# Patient Record
Sex: MALE | Race: WHITE | Employment: OTHER | URBAN - METROPOLITAN AREA
[De-identification: names, ages, dates, MRNs, and addresses within clinical notes are randomized per-mention and may not be internally consistent; named-entity substitution may affect disease eponyms.]

---

## 2017-01-01 ENCOUNTER — HOSPITAL ENCOUNTER (OUTPATIENT)
Dept: LAB | Age: 82
Discharge: HOME | End: 2017-06-27
Payer: MEDICARE

## 2017-01-01 ENCOUNTER — ANESTHESIA (OUTPATIENT)
Dept: CARDIOTHORACIC SURGERY | Age: 82
DRG: 269 | End: 2017-01-01
Payer: MEDICARE

## 2017-01-01 ENCOUNTER — HOSPITAL ENCOUNTER (OUTPATIENT)
Dept: LAB | Age: 82
Discharge: HOME | DRG: 269 | End: 2017-08-09
Payer: MEDICARE

## 2017-01-01 ENCOUNTER — ANESTHESIA EVENT (OUTPATIENT)
Dept: CARDIOTHORACIC SURGERY | Age: 82
DRG: 269 | End: 2017-01-01
Payer: MEDICARE

## 2017-01-01 ENCOUNTER — HOSPITAL ENCOUNTER (OUTPATIENT)
Dept: LAB | Age: 82
Discharge: HOME | End: 2017-05-16
Payer: MEDICARE

## 2017-01-01 ENCOUNTER — HOSPITAL ENCOUNTER (OUTPATIENT)
Dept: LAB | Age: 82
Discharge: HOME | DRG: 269 | End: 2017-08-11
Payer: MEDICARE

## 2017-01-01 ENCOUNTER — HOSPITAL ENCOUNTER (OUTPATIENT)
Dept: GENERAL RADIOLOGY | Age: 82
Discharge: HOME | DRG: 269 | End: 2017-08-09
Payer: MEDICARE

## 2017-01-01 ENCOUNTER — OFFICE VISIT (OUTPATIENT)
Dept: INTERNAL MEDICINE CLINIC | Age: 82
End: 2017-01-01

## 2017-01-01 ENCOUNTER — OFFICE VISIT (OUTPATIENT)
Dept: VASCULAR SURGERY | Age: 82
End: 2017-01-01

## 2017-01-01 ENCOUNTER — HOSPITAL ENCOUNTER (INPATIENT)
Age: 82
LOS: 1 days | Discharge: HOME | DRG: 269 | End: 2017-08-11
Attending: SURGERY | Admitting: SURGERY
Payer: MEDICARE

## 2017-01-01 ENCOUNTER — PATIENT OUTREACH (OUTPATIENT)
Dept: INTERNAL MEDICINE CLINIC | Age: 82
End: 2017-01-01

## 2017-01-01 ENCOUNTER — OFFICE VISIT (OUTPATIENT)
Dept: CARDIOLOGY CLINIC | Age: 82
End: 2017-01-01

## 2017-01-01 ENCOUNTER — HOSPITAL ENCOUNTER (EMERGENCY)
Age: 82
Discharge: HOME | End: 2017-04-16
Attending: EMERGENCY MEDICINE
Payer: MEDICARE

## 2017-01-01 ENCOUNTER — TELEPHONE (OUTPATIENT)
Dept: ORTHOPEDIC SURGERY | Age: 82
End: 2017-01-01

## 2017-01-01 ENCOUNTER — OFFICE VISIT (OUTPATIENT)
Dept: ORTHOPEDIC SURGERY | Age: 82
End: 2017-01-01

## 2017-01-01 ENCOUNTER — HOSPITAL ENCOUNTER (OUTPATIENT)
Dept: LAB | Age: 82
Discharge: HOME | End: 2017-02-07
Payer: MEDICARE

## 2017-01-01 ENCOUNTER — APPOINTMENT (OUTPATIENT)
Dept: GENERAL RADIOLOGY | Age: 82
DRG: 269 | End: 2017-01-01
Attending: SURGERY
Payer: MEDICARE

## 2017-01-01 ENCOUNTER — HOSPITAL ENCOUNTER (OUTPATIENT)
Dept: LAB | Age: 82
Discharge: HOME | End: 2017-02-10
Payer: MEDICARE

## 2017-01-01 ENCOUNTER — LAB ONLY (OUTPATIENT)
Dept: INTERNAL MEDICINE CLINIC | Age: 82
End: 2017-01-01

## 2017-01-01 ENCOUNTER — APPOINTMENT (OUTPATIENT)
Dept: GENERAL RADIOLOGY | Age: 82
End: 2017-01-01
Attending: EMERGENCY MEDICINE
Payer: MEDICARE

## 2017-01-01 ENCOUNTER — APPOINTMENT (OUTPATIENT)
Dept: CT IMAGING | Age: 82
End: 2017-01-01
Attending: EMERGENCY MEDICINE
Payer: MEDICARE

## 2017-01-01 ENCOUNTER — HOSPITAL ENCOUNTER (OUTPATIENT)
Dept: NON INVASIVE DIAGNOSTICS | Age: 82
Discharge: HOME | End: 2017-08-04
Attending: INTERNAL MEDICINE
Payer: MEDICARE

## 2017-01-01 VITALS
HEIGHT: 71 IN | OXYGEN SATURATION: 96 % | WEIGHT: 202.6 LBS | TEMPERATURE: 97.9 F | RESPIRATION RATE: 24 BRPM | DIASTOLIC BLOOD PRESSURE: 75 MMHG | SYSTOLIC BLOOD PRESSURE: 125 MMHG | HEART RATE: 68 BPM | BODY MASS INDEX: 28.36 KG/M2

## 2017-01-01 VITALS
SYSTOLIC BLOOD PRESSURE: 134 MMHG | BODY MASS INDEX: 29.55 KG/M2 | HEART RATE: 62 BPM | WEIGHT: 206.4 LBS | TEMPERATURE: 97.8 F | DIASTOLIC BLOOD PRESSURE: 72 MMHG | OXYGEN SATURATION: 97 % | HEIGHT: 70 IN | RESPIRATION RATE: 14 BRPM

## 2017-01-01 VITALS
WEIGHT: 213.4 LBS | RESPIRATION RATE: 12 BRPM | OXYGEN SATURATION: 95 % | TEMPERATURE: 98 F | HEIGHT: 70 IN | BODY MASS INDEX: 30.55 KG/M2 | SYSTOLIC BLOOD PRESSURE: 138 MMHG | DIASTOLIC BLOOD PRESSURE: 62 MMHG | HEART RATE: 71 BPM

## 2017-01-01 VITALS
RESPIRATION RATE: 18 BRPM | SYSTOLIC BLOOD PRESSURE: 140 MMHG | DIASTOLIC BLOOD PRESSURE: 65 MMHG | BODY MASS INDEX: 29.35 KG/M2 | OXYGEN SATURATION: 98 % | HEART RATE: 68 BPM | WEIGHT: 205 LBS | HEIGHT: 70 IN | TEMPERATURE: 97.5 F

## 2017-01-01 VITALS
BODY MASS INDEX: 28 KG/M2 | RESPIRATION RATE: 16 BRPM | WEIGHT: 200 LBS | HEART RATE: 69 BPM | DIASTOLIC BLOOD PRESSURE: 86 MMHG | SYSTOLIC BLOOD PRESSURE: 144 MMHG | HEIGHT: 71 IN

## 2017-01-01 VITALS
TEMPERATURE: 98.4 F | OXYGEN SATURATION: 94 % | DIASTOLIC BLOOD PRESSURE: 60 MMHG | HEIGHT: 71 IN | BODY MASS INDEX: 30.88 KG/M2 | HEART RATE: 93 BPM | RESPIRATION RATE: 14 BRPM | WEIGHT: 220.6 LBS | SYSTOLIC BLOOD PRESSURE: 128 MMHG

## 2017-01-01 VITALS
TEMPERATURE: 95.3 F | BODY MASS INDEX: 32.01 KG/M2 | SYSTOLIC BLOOD PRESSURE: 125 MMHG | WEIGHT: 223.6 LBS | HEIGHT: 70 IN | HEART RATE: 64 BPM | DIASTOLIC BLOOD PRESSURE: 56 MMHG

## 2017-01-01 VITALS
DIASTOLIC BLOOD PRESSURE: 74 MMHG | HEIGHT: 71 IN | BODY MASS INDEX: 29.68 KG/M2 | HEART RATE: 64 BPM | OXYGEN SATURATION: 96 % | SYSTOLIC BLOOD PRESSURE: 144 MMHG | WEIGHT: 212 LBS

## 2017-01-01 VITALS
HEIGHT: 70 IN | RESPIRATION RATE: 14 BRPM | BODY MASS INDEX: 32.24 KG/M2 | SYSTOLIC BLOOD PRESSURE: 132 MMHG | DIASTOLIC BLOOD PRESSURE: 64 MMHG | OXYGEN SATURATION: 97 % | TEMPERATURE: 98.2 F | HEART RATE: 67 BPM | WEIGHT: 225.2 LBS

## 2017-01-01 VITALS
OXYGEN SATURATION: 96 % | WEIGHT: 208.8 LBS | BODY MASS INDEX: 29.89 KG/M2 | TEMPERATURE: 97.7 F | SYSTOLIC BLOOD PRESSURE: 126 MMHG | HEART RATE: 60 BPM | RESPIRATION RATE: 12 BRPM | DIASTOLIC BLOOD PRESSURE: 54 MMHG | HEIGHT: 70 IN

## 2017-01-01 VITALS — RESPIRATION RATE: 14 BRPM | HEART RATE: 72 BPM | DIASTOLIC BLOOD PRESSURE: 76 MMHG | SYSTOLIC BLOOD PRESSURE: 126 MMHG

## 2017-01-01 VITALS
RESPIRATION RATE: 11 BRPM | HEIGHT: 70 IN | SYSTOLIC BLOOD PRESSURE: 136 MMHG | HEART RATE: 77 BPM | DIASTOLIC BLOOD PRESSURE: 64 MMHG | WEIGHT: 206 LBS | BODY MASS INDEX: 29.49 KG/M2

## 2017-01-01 DIAGNOSIS — R80.9 PROTEINURIA: ICD-10-CM

## 2017-01-01 DIAGNOSIS — K52.9 ENTERITIS: Primary | ICD-10-CM

## 2017-01-01 DIAGNOSIS — D50.9 IRON DEFICIENCY ANEMIA, UNSPECIFIED IRON DEFICIENCY ANEMIA TYPE: ICD-10-CM

## 2017-01-01 DIAGNOSIS — R01.1 MURMUR: ICD-10-CM

## 2017-01-01 DIAGNOSIS — N18.4: ICD-10-CM

## 2017-01-01 DIAGNOSIS — I71.40 ABDOMINAL AORTIC ANEURYSM (AAA) WITHOUT RUPTURE: Primary | ICD-10-CM

## 2017-01-01 DIAGNOSIS — R06.00 DYSPNEA, UNSPECIFIED TYPE: ICD-10-CM

## 2017-01-01 DIAGNOSIS — N18.4 CHRONIC KIDNEY DISEASE, STAGE IV (SEVERE) (HCC): ICD-10-CM

## 2017-01-01 DIAGNOSIS — I71.40 ABDOMINAL AORTIC ANEURYSM (AAA) WITHOUT RUPTURE: ICD-10-CM

## 2017-01-01 DIAGNOSIS — Z71.89 ADVANCE DIRECTIVE DISCUSSED WITH PATIENT: ICD-10-CM

## 2017-01-01 DIAGNOSIS — D64.9 CHRONIC ANEMIA: ICD-10-CM

## 2017-01-01 DIAGNOSIS — R80.9 PROTEINURIA, UNSPECIFIED TYPE: ICD-10-CM

## 2017-01-01 DIAGNOSIS — I13.10: ICD-10-CM

## 2017-01-01 DIAGNOSIS — R06.02 SOB (SHORTNESS OF BREATH): ICD-10-CM

## 2017-01-01 DIAGNOSIS — N18.4 CHRONIC RENAL FAILURE, STAGE 4 (SEVERE) (HCC): Primary | ICD-10-CM

## 2017-01-01 DIAGNOSIS — I10 ESSENTIAL HYPERTENSION WITH GOAL BLOOD PRESSURE LESS THAN 140/90: ICD-10-CM

## 2017-01-01 DIAGNOSIS — N25.81 SECONDARY HYPERPARATHYROIDISM (HCC): Primary | ICD-10-CM

## 2017-01-01 DIAGNOSIS — N18.4 CHRONIC KIDNEY DISEASE, STAGE 4 (SEVERE) (HCC): ICD-10-CM

## 2017-01-01 DIAGNOSIS — N18.4 CKD (CHRONIC KIDNEY DISEASE), STAGE 4 (SEVERE): ICD-10-CM
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
## 2017-01-01 DIAGNOSIS — R60.0 BILATERAL EDEMA OF LOWER EXTREMITY: ICD-10-CM

## 2017-01-01 DIAGNOSIS — E78.5 HYPERLIPIDEMIA LDL GOAL <100: ICD-10-CM

## 2017-01-01 DIAGNOSIS — H81.10 BENIGN POSITIONAL VERTIGO, UNSPECIFIED LATERALITY: ICD-10-CM

## 2017-01-01 DIAGNOSIS — N18.4 CHRONIC RENAL FAILURE, STAGE 4 (SEVERE) (HCC): ICD-10-CM

## 2017-01-01 DIAGNOSIS — G89.29 CHRONIC PAIN OF LEFT KNEE: Primary | ICD-10-CM

## 2017-01-01 DIAGNOSIS — M25.562 CHRONIC PAIN OF LEFT KNEE: Primary | ICD-10-CM

## 2017-01-01 DIAGNOSIS — I48.0 PAF (PAROXYSMAL ATRIAL FIBRILLATION) (HCC): ICD-10-CM

## 2017-01-01 DIAGNOSIS — I50.31 ACUTE DIASTOLIC CHF (CONGESTIVE HEART FAILURE) (HCC): Primary | ICD-10-CM

## 2017-01-01 DIAGNOSIS — N25.81 SECONDARY HYPERPARATHYROIDISM (HCC): ICD-10-CM

## 2017-01-01 DIAGNOSIS — R25.2 CRAMPS OF RIGHT LOWER EXTREMITY: ICD-10-CM

## 2017-01-01 DIAGNOSIS — R06.00 DYSPNEA, UNSPECIFIED TYPE: Primary | ICD-10-CM

## 2017-01-01 DIAGNOSIS — I12.9 HYPERTENSIVE CHRONIC KIDNEY DISEASE WITH STAGE 1 THROUGH STAGE 4 CHRONIC KIDNEY DISEASE, OR UNSPECIFIED CHRONIC KIDNEY DISEASE: ICD-10-CM

## 2017-01-01 DIAGNOSIS — M17.12 PRIMARY OSTEOARTHRITIS OF LEFT KNEE: ICD-10-CM

## 2017-01-01 DIAGNOSIS — I48.0 PAF (PAROXYSMAL ATRIAL FIBRILLATION) (HCC): Primary | ICD-10-CM

## 2017-01-01 DIAGNOSIS — N18.4 CHRONIC KIDNEY DISEASE, STAGE 4 (SEVERE) (HCC): Primary | ICD-10-CM

## 2017-01-01 DIAGNOSIS — I73.9 PAD (PERIPHERAL ARTERY DISEASE) (HCC): ICD-10-CM

## 2017-01-01 DIAGNOSIS — Z00.00 MEDICARE ANNUAL WELLNESS VISIT, SUBSEQUENT: ICD-10-CM

## 2017-01-01 LAB
25(OH)D3 SERPL-MCNC: 21.5 NG/ML (ref 30–100)
25(OH)D3 SERPL-MCNC: 32.5 NG/ML (ref 30–100)
ABO + RH BLD: NORMAL
ACT BLD: 197 SECS (ref 79–138)
ACT BLD: 208 SECS (ref 79–138)
ALBUMIN SERPL BCP-MCNC: 3.4 G/DL (ref 3.4–5)
ALBUMIN SERPL BCP-MCNC: 3.5 G/DL (ref 3.4–5)
ALBUMIN SERPL BCP-MCNC: 3.6 G/DL (ref 3.4–5)
ALBUMIN SERPL BCP-MCNC: 3.7 G/DL (ref 3.4–5)
ALBUMIN SERPL BCP-MCNC: 3.8 G/DL (ref 3.4–5)
ALBUMIN SERPL BCP-MCNC: 3.9 G/DL (ref 3.4–5)
ALBUMIN/GLOB SERPL: 0.9 {RATIO} (ref 0.8–1.7)
ALBUMIN/GLOB SERPL: 1.1 {RATIO} (ref 0.8–1.7)
ALBUMIN/GLOB SERPL: 1.2 {RATIO} (ref 0.8–1.7)
ALP SERPL-CCNC: 151 U/L (ref 45–117)
ALP SERPL-CCNC: 156 U/L (ref 45–117)
ALP SERPL-CCNC: 159 U/L (ref 45–117)
ALT SERPL-CCNC: 32 U/L (ref 16–61)
ALT SERPL-CCNC: 37 U/L (ref 16–61)
ALT SERPL-CCNC: 40 U/L (ref 16–61)
ANION GAP BLD CALC-SCNC: 10 MMOL/L (ref 3–18)
ANION GAP BLD CALC-SCNC: 11 MMOL/L (ref 3–18)
ANION GAP BLD CALC-SCNC: 12 MMOL/L (ref 3–18)
ANION GAP BLD CALC-SCNC: 13 MMOL/L (ref 3–18)
ANION GAP BLD CALC-SCNC: 9 MMOL/L (ref 3–18)
ANION GAP BLD CALC-SCNC: 9 MMOL/L (ref 3–18)
APPEARANCE UR: CLEAR
ARTERIAL PATENCY WRIST A: ABNORMAL
ARTERIAL PATENCY WRIST A: YES
AST SERPL W P-5'-P-CCNC: 19 U/L (ref 15–37)
AST SERPL W P-5'-P-CCNC: 19 U/L (ref 15–37)
AST SERPL W P-5'-P-CCNC: 22 U/L (ref 15–37)
BACTERIA URNS QL MICRO: NEGATIVE /HPF
BASE DEFICIT BLD-SCNC: 7 MMOL/L
BASE DEFICIT BLD-SCNC: 8 MMOL/L
BASOPHILS # BLD AUTO: 0 K/UL (ref 0–0.06)
BASOPHILS # BLD AUTO: 0 K/UL (ref 0–0.06)
BASOPHILS # BLD AUTO: 0.1 K/UL (ref 0–0.06)
BASOPHILS # BLD: 0 % (ref 0–2)
BASOPHILS # BLD: 1 % (ref 0–2)
BASOPHILS # BLD: 1 % (ref 0–2)
BDY SITE: ABNORMAL
BDY SITE: ABNORMAL
BILIRUB SERPL-MCNC: 0.5 MG/DL (ref 0.2–1)
BILIRUB UR QL: NEGATIVE
BLOOD GROUP ANTIBODIES SERPL: NORMAL
BNP SERPL-MCNC: ABNORMAL PG/ML (ref 0–1800)
BUN SERPL-MCNC: 33 MG/DL (ref 7–18)
BUN SERPL-MCNC: 40 MG/DL (ref 7–18)
BUN SERPL-MCNC: 42 MG/DL (ref 7–18)
BUN SERPL-MCNC: 45 MG/DL (ref 7–18)
BUN SERPL-MCNC: 45 MG/DL (ref 7–18)
BUN SERPL-MCNC: 47 MG/DL (ref 7–18)
BUN SERPL-MCNC: 53 MG/DL (ref 7–18)
BUN SERPL-MCNC: 58 MG/DL (ref 7–18)
BUN/CREAT SERPL: 13 (ref 12–20)
BUN/CREAT SERPL: 14 (ref 12–20)
BUN/CREAT SERPL: 15 (ref 12–20)
BUN/CREAT SERPL: 16 (ref 12–20)
BUN/CREAT SERPL: 16 (ref 12–20)
BUN/CREAT SERPL: 17 (ref 12–20)
BUN/CREAT SERPL: 19 (ref 12–20)
BUN/CREAT SERPL: 20 (ref 12–20)
CA-I BLD-MCNC: 1.2 MMOL/L (ref 1.12–1.32)
CA-I BLD-MCNC: 1.27 MMOL/L (ref 1.12–1.32)
CALCIUM SERPL-MCNC: 8.2 MG/DL (ref 8.5–10.1)
CALCIUM SERPL-MCNC: 8.3 MG/DL (ref 8.5–10.1)
CALCIUM SERPL-MCNC: 8.3 MG/DL (ref 8.5–10.1)
CALCIUM SERPL-MCNC: 8.4 MG/DL (ref 8.5–10.1)
CALCIUM SERPL-MCNC: 8.4 MG/DL (ref 8.5–10.1)
CALCIUM SERPL-MCNC: 8.6 MG/DL (ref 8.5–10.1)
CALCIUM SERPL-MCNC: 8.7 MG/DL (ref 8.5–10.1)
CALCIUM SERPL-MCNC: 8.7 MG/DL (ref 8.5–10.1)
CALCIUM SERPL-MCNC: 9 MG/DL (ref 8.5–10.1)
CALCIUM SERPL-MCNC: 9.1 MG/DL (ref 8.5–10.1)
CALCIUM SERPL-MCNC: 9.3 MG/DL (ref 8.5–10.1)
CHLORIDE SERPL-SCNC: 102 MMOL/L (ref 100–108)
CHLORIDE SERPL-SCNC: 104 MMOL/L (ref 100–108)
CHLORIDE SERPL-SCNC: 105 MMOL/L (ref 100–108)
CHLORIDE SERPL-SCNC: 106 MMOL/L (ref 100–108)
CHLORIDE SERPL-SCNC: 107 MMOL/L (ref 100–108)
CHLORIDE SERPL-SCNC: 108 MMOL/L (ref 100–108)
CHLORIDE SERPL-SCNC: 109 MMOL/L (ref 100–108)
CHLORIDE SERPL-SCNC: 109 MMOL/L (ref 100–108)
CHOLEST SERPL-MCNC: 161 MG/DL
CO2 SERPL-SCNC: 19 MMOL/L (ref 21–32)
CO2 SERPL-SCNC: 20 MMOL/L (ref 21–32)
CO2 SERPL-SCNC: 20 MMOL/L (ref 21–32)
CO2 SERPL-SCNC: 22 MMOL/L (ref 21–32)
CO2 SERPL-SCNC: 22 MMOL/L (ref 21–32)
CO2 SERPL-SCNC: 23 MMOL/L (ref 21–32)
CO2 SERPL-SCNC: 23 MMOL/L (ref 21–32)
CO2 SERPL-SCNC: 24 MMOL/L (ref 21–32)
COLOR UR: YELLOW
CREAT SERPL-MCNC: 2.47 MG/DL (ref 0.6–1.3)
CREAT SERPL-MCNC: 2.55 MG/DL (ref 0.6–1.3)
CREAT SERPL-MCNC: 2.75 MG/DL (ref 0.6–1.3)
CREAT SERPL-MCNC: 2.79 MG/DL (ref 0.6–1.3)
CREAT SERPL-MCNC: 2.82 MG/DL (ref 0.6–1.3)
CREAT SERPL-MCNC: 2.87 MG/DL (ref 0.6–1.3)
CREAT SERPL-MCNC: 3 MG/DL (ref 0.6–1.3)
CREAT SERPL-MCNC: 3.1 MG/DL (ref 0.6–1.3)
CREAT UR-MCNC: 222 MG/DL (ref 30–125)
CREAT UR-MCNC: 222 MG/DL (ref 30–125)
CREAT UR-MCNC: 59.4 MG/DL (ref 30–125)
CREAT UR-MCNC: 66.56 MG/DL (ref 30–125)
CREAT UR-MCNC: 69.2 MG/DL (ref 30–125)
DIFFERENTIAL METHOD BLD: ABNORMAL
EOSINOPHIL # BLD: 0 K/UL (ref 0–0.4)
EOSINOPHIL # BLD: 0.2 K/UL (ref 0–0.4)
EOSINOPHIL # BLD: 0.2 K/UL (ref 0–0.4)
EOSINOPHIL NFR BLD: 0 % (ref 0–5)
EOSINOPHIL NFR BLD: 2 % (ref 0–5)
EOSINOPHIL NFR BLD: 3 % (ref 0–5)
EPITH CASTS URNS QL MICRO: NORMAL /LPF (ref 0–5)
ERYTHROCYTE [DISTWIDTH] IN BLOOD BY AUTOMATED COUNT: 14.2 % (ref 11.6–14.5)
ERYTHROCYTE [DISTWIDTH] IN BLOOD BY AUTOMATED COUNT: 14.4 % (ref 11.6–14.5)
ERYTHROCYTE [DISTWIDTH] IN BLOOD BY AUTOMATED COUNT: 14.9 % (ref 11.6–14.5)
ERYTHROCYTE [DISTWIDTH] IN BLOOD BY AUTOMATED COUNT: 15.7 % (ref 11.6–14.5)
ERYTHROCYTE [DISTWIDTH] IN BLOOD BY AUTOMATED COUNT: 15.8 % (ref 11.6–14.5)
FERRITIN SERPL-MCNC: 111 NG/ML (ref 8–388)
FERRITIN SERPL-MCNC: 156 NG/ML (ref 8–388)
FERRITIN SERPL-MCNC: 172 NG/ML (ref 8–388)
GAS FLOW.O2 O2 DELIVERY SYS: ABNORMAL L/MIN
GAS FLOW.O2 O2 DELIVERY SYS: ABNORMAL L/MIN
GLOBULIN SER CALC-MCNC: 3.2 G/DL (ref 2–4)
GLOBULIN SER CALC-MCNC: 3.2 G/DL (ref 2–4)
GLOBULIN SER CALC-MCNC: 4.4 G/DL (ref 2–4)
GLUCOSE BLD STRIP.AUTO-MCNC: 105 MG/DL (ref 74–106)
GLUCOSE BLD STRIP.AUTO-MCNC: 107 MG/DL (ref 74–106)
GLUCOSE SERPL-MCNC: 102 MG/DL (ref 74–99)
GLUCOSE SERPL-MCNC: 122 MG/DL (ref 74–99)
GLUCOSE SERPL-MCNC: 138 MG/DL (ref 74–99)
GLUCOSE SERPL-MCNC: 144 MG/DL (ref 74–99)
GLUCOSE SERPL-MCNC: 81 MG/DL (ref 74–99)
GLUCOSE SERPL-MCNC: 84 MG/DL (ref 74–99)
GLUCOSE SERPL-MCNC: 87 MG/DL (ref 74–99)
GLUCOSE SERPL-MCNC: 94 MG/DL (ref 74–99)
GLUCOSE UR STRIP.AUTO-MCNC: NEGATIVE MG/DL
HCO3 BLD-SCNC: 18.4 MMOL/L (ref 22–26)
HCO3 BLD-SCNC: 18.9 MMOL/L (ref 22–26)
HCT VFR BLD AUTO: 28.9 % (ref 36–48)
HCT VFR BLD AUTO: 29.2 % (ref 36–48)
HCT VFR BLD AUTO: 31.4 % (ref 36–48)
HCT VFR BLD AUTO: 31.9 % (ref 36–48)
HCT VFR BLD AUTO: 32.4 % (ref 36–48)
HCT VFR BLD AUTO: 32.5 % (ref 36–48)
HCT VFR BLD AUTO: 33.6 % (ref 36–48)
HCT VFR BLD AUTO: 35.3 % (ref 36–48)
HCT VFR BLD CALC: 24 % (ref 36–49)
HCT VFR BLD CALC: 26 % (ref 36–49)
HDLC SERPL-MCNC: 44 MG/DL (ref 40–60)
HDLC SERPL: 3.7 {RATIO} (ref 0–5)
HGB BLD-MCNC: 10 G/DL (ref 13–16)
HGB BLD-MCNC: 10.3 G/DL (ref 13–16)
HGB BLD-MCNC: 10.4 G/DL (ref 13–16)
HGB BLD-MCNC: 10.4 G/DL (ref 13–16)
HGB BLD-MCNC: 10.6 G/DL (ref 13–16)
HGB BLD-MCNC: 11.6 G/DL (ref 13–16)
HGB BLD-MCNC: 8.2 G/DL (ref 12–16)
HGB BLD-MCNC: 8.8 G/DL (ref 12–16)
HGB BLD-MCNC: 9.5 G/DL (ref 13–16)
HGB BLD-MCNC: 9.5 G/DL (ref 13–16)
HGB UR QL STRIP: ABNORMAL
INR PPP: 1.1 (ref 0.8–1.2)
IRON SATN MFR SERPL: 12 %
IRON SATN MFR SERPL: 14 %
IRON SATN MFR SERPL: 20 %
IRON SERPL-MCNC: 28 UG/DL (ref 50–175)
IRON SERPL-MCNC: 40 UG/DL (ref 50–175)
IRON SERPL-MCNC: 47 UG/DL (ref 50–175)
KETONES UR QL STRIP.AUTO: NEGATIVE MG/DL
LDLC SERPL CALC-MCNC: 104.6 MG/DL (ref 0–100)
LEUKOCYTE ESTERASE UR QL STRIP.AUTO: NEGATIVE
LIPASE SERPL-CCNC: 106 U/L (ref 73–393)
LIPID PROFILE,FLP: ABNORMAL
LYMPHOCYTES # BLD AUTO: 19 % (ref 21–52)
LYMPHOCYTES # BLD AUTO: 30 % (ref 21–52)
LYMPHOCYTES # BLD AUTO: 6 % (ref 21–52)
LYMPHOCYTES # BLD: 0.6 K/UL (ref 0.9–3.6)
LYMPHOCYTES # BLD: 1.5 K/UL (ref 0.9–3.6)
LYMPHOCYTES # BLD: 2 K/UL (ref 0.9–3.6)
MAGNESIUM SERPL-MCNC: 1.9 MG/DL (ref 1.6–2.6)
MAGNESIUM SERPL-MCNC: 2.3 MG/DL (ref 1.6–2.6)
MCH RBC QN AUTO: 29.1 PG (ref 24–34)
MCH RBC QN AUTO: 29.4 PG (ref 24–34)
MCH RBC QN AUTO: 29.8 PG (ref 24–34)
MCH RBC QN AUTO: 30 PG (ref 24–34)
MCH RBC QN AUTO: 30.2 PG (ref 24–34)
MCHC RBC AUTO-ENTMCNC: 31.8 G/DL (ref 31–37)
MCHC RBC AUTO-ENTMCNC: 31.8 G/DL (ref 31–37)
MCHC RBC AUTO-ENTMCNC: 32.6 G/DL (ref 31–37)
MCHC RBC AUTO-ENTMCNC: 32.9 G/DL (ref 31–37)
MCHC RBC AUTO-ENTMCNC: 32.9 G/DL (ref 31–37)
MCV RBC AUTO: 89.6 FL (ref 74–97)
MCV RBC AUTO: 90.6 FL (ref 74–97)
MCV RBC AUTO: 91.3 FL (ref 74–97)
MCV RBC AUTO: 92.7 FL (ref 74–97)
MCV RBC AUTO: 94.5 FL (ref 74–97)
MICROALBUMIN UR-MCNC: 12.1 MG/DL (ref 0–3)
MICROALBUMIN UR-MCNC: 27.7 MG/DL (ref 0–3)
MICROALBUMIN UR-MCNC: 3.45 MG/DL (ref 0–3)
MICROALBUMIN/CREAT UR-RTO: 125 MG/G (ref 0–30)
MICROALBUMIN/CREAT UR-RTO: 182 MG/G (ref 0–30)
MICROALBUMIN/CREAT UR-RTO: 58 MG/G (ref 0–30)
MONOCYTES # BLD: 0.4 K/UL (ref 0.05–1.2)
MONOCYTES # BLD: 0.8 K/UL (ref 0.05–1.2)
MONOCYTES # BLD: 1 K/UL (ref 0.05–1.2)
MONOCYTES NFR BLD AUTO: 12 % (ref 3–10)
MONOCYTES NFR BLD AUTO: 13 % (ref 3–10)
MONOCYTES NFR BLD AUTO: 4 % (ref 3–10)
NEUTS SEG # BLD: 3.7 K/UL (ref 1.8–8)
NEUTS SEG # BLD: 5.2 K/UL (ref 1.8–8)
NEUTS SEG # BLD: 9 K/UL (ref 1.8–8)
NEUTS SEG NFR BLD AUTO: 54 % (ref 40–73)
NEUTS SEG NFR BLD AUTO: 65 % (ref 40–73)
NEUTS SEG NFR BLD AUTO: 90 % (ref 40–73)
NITRITE UR QL STRIP.AUTO: NEGATIVE
PCO2 BLD: 37.8 MMHG (ref 35–45)
PCO2 BLD: 41.7 MMHG (ref 35–45)
PH BLD: 7.25 [PH] (ref 7.35–7.45)
PH BLD: 7.31 [PH] (ref 7.35–7.45)
PH UR STRIP: 5 [PH] (ref 5–8)
PHOSPHATE SERPL-MCNC: 3.3 MG/DL (ref 2.5–4.9)
PHOSPHATE SERPL-MCNC: 4 MG/DL (ref 2.5–4.9)
PHOSPHATE SERPL-MCNC: 4.5 MG/DL (ref 2.5–4.9)
PHOSPHATE SERPL-MCNC: 4.6 MG/DL (ref 2.5–4.9)
PLATELET # BLD AUTO: 166 K/UL (ref 135–420)
PLATELET # BLD AUTO: 220 K/UL (ref 135–420)
PLATELET # BLD AUTO: 240 K/UL (ref 135–420)
PLATELET # BLD AUTO: 252 K/UL (ref 135–420)
PLATELET # BLD AUTO: 260 K/UL (ref 135–420)
PLATELET COMMENTS,PCOM: ABNORMAL
PMV BLD AUTO: 10.3 FL (ref 9.2–11.8)
PMV BLD AUTO: 10.8 FL (ref 9.2–11.8)
PMV BLD AUTO: 9.6 FL (ref 9.2–11.8)
PMV BLD AUTO: 9.6 FL (ref 9.2–11.8)
PO2 BLD: 119 MMHG (ref 80–100)
PO2 BLD: 94 MMHG (ref 80–100)
POTASSIUM BLD-SCNC: 3.9 MMOL/L (ref 3.5–5.5)
POTASSIUM BLD-SCNC: 4 MMOL/L (ref 3.5–5.5)
POTASSIUM SERPL-SCNC: 3.8 MMOL/L (ref 3.5–5.5)
POTASSIUM SERPL-SCNC: 4.1 MMOL/L (ref 3.5–5.5)
POTASSIUM SERPL-SCNC: 4.2 MMOL/L (ref 3.5–5.5)
POTASSIUM SERPL-SCNC: 4.3 MMOL/L (ref 3.5–5.5)
POTASSIUM SERPL-SCNC: 4.3 MMOL/L (ref 3.5–5.5)
POTASSIUM SERPL-SCNC: 4.7 MMOL/L (ref 3.5–5.5)
POTASSIUM SERPL-SCNC: 4.7 MMOL/L (ref 3.5–5.5)
POTASSIUM SERPL-SCNC: 5.5 MMOL/L (ref 3.5–5.5)
PROT SERPL-MCNC: 6.7 G/DL (ref 6.4–8.2)
PROT SERPL-MCNC: 7 G/DL (ref 6.4–8.2)
PROT SERPL-MCNC: 8.3 G/DL (ref 6.4–8.2)
PROT UR STRIP-MCNC: 100 MG/DL
PROTHROMBIN TIME: 13.9 SEC (ref 11.5–15.2)
PTH-INTACT SERPL-MCNC: 125.9 PG/ML (ref 14–72)
PTH-INTACT SERPL-MCNC: 131.2 PG/ML (ref 14–72)
PTH-INTACT SERPL-MCNC: 89.4 PG/ML (ref 14–72)
RBC # BLD AUTO: 3.19 M/UL (ref 4.7–5.5)
RBC # BLD AUTO: 3.43 M/UL (ref 4.7–5.5)
RBC # BLD AUTO: 3.44 M/UL (ref 4.7–5.5)
RBC # BLD AUTO: 3.44 M/UL (ref 4.7–5.5)
RBC # BLD AUTO: 3.94 M/UL (ref 4.7–5.5)
RBC #/AREA URNS HPF: NORMAL /HPF (ref 0–5)
RBC MORPH BLD: ABNORMAL
SAO2 % BLD: 97 % (ref 92–97)
SAO2 % BLD: 98 % (ref 92–97)
SERVICE CMNT-IMP: ABNORMAL
SERVICE CMNT-IMP: ABNORMAL
SODIUM BLD-SCNC: 138 MMOL/L (ref 136–145)
SODIUM BLD-SCNC: 139 MMOL/L (ref 136–145)
SODIUM SERPL-SCNC: 137 MMOL/L (ref 136–145)
SODIUM SERPL-SCNC: 137 MMOL/L (ref 136–145)
SODIUM SERPL-SCNC: 138 MMOL/L (ref 136–145)
SODIUM SERPL-SCNC: 139 MMOL/L (ref 136–145)
SODIUM SERPL-SCNC: 142 MMOL/L (ref 136–145)
SP GR UR REFRACTOMETRY: 1.02 (ref 1–1.03)
SPECIMEN EXP DATE BLD: NORMAL
SPECIMEN TYPE: ABNORMAL
SPECIMEN TYPE: ABNORMAL
TIBC SERPL-MCNC: 235 UG/DL (ref 250–450)
TIBC SERPL-MCNC: 241 UG/DL (ref 250–450)
TIBC SERPL-MCNC: 279 UG/DL (ref 250–450)
TRIGL SERPL-MCNC: 62 MG/DL (ref ?–150)
UROBILINOGEN UR QL STRIP.AUTO: 1 EU/DL (ref 0.2–1)
VLDLC SERPL CALC-MCNC: 12.4 MG/DL
WBC # BLD AUTO: 10 K/UL (ref 4.6–13.2)
WBC # BLD AUTO: 10.8 K/UL (ref 4.6–13.2)
WBC # BLD AUTO: 6.7 K/UL (ref 4.6–13.2)
WBC # BLD AUTO: 7.2 K/UL (ref 4.6–13.2)
WBC # BLD AUTO: 8 K/UL (ref 4.6–13.2)
WBC URNS QL MICRO: NORMAL /HPF (ref 0–4)

## 2017-01-01 PROCEDURE — 77030005401 HC CATH RAD ARRO -A: Performed by: ANESTHESIOLOGY

## 2017-01-01 PROCEDURE — 77030002933 HC SUT MCRYL J&J -A: Performed by: SURGERY

## 2017-01-01 PROCEDURE — 80053 COMPREHEN METABOLIC PANEL: CPT | Performed by: INTERNAL MEDICINE

## 2017-01-01 PROCEDURE — 77030008500 HC TBNG CONN PRSS BD -A: Performed by: ANESTHESIOLOGY

## 2017-01-01 PROCEDURE — C1769 GUIDE WIRE: HCPCS | Performed by: SURGERY

## 2017-01-01 PROCEDURE — 77030013058 HC DEV INFL ANGIO BSC -B: Performed by: SURGERY

## 2017-01-01 PROCEDURE — 82803 BLOOD GASES ANY COMBINATION: CPT

## 2017-01-01 PROCEDURE — 36415 COLL VENOUS BLD VENIPUNCTURE: CPT | Performed by: INTERNAL MEDICINE

## 2017-01-01 PROCEDURE — 74011250636 HC RX REV CODE- 250/636: Performed by: SURGERY

## 2017-01-01 PROCEDURE — 77030020061 HC IV BLD WRMR ADMIN SET 3M -B: Performed by: ANESTHESIOLOGY

## 2017-01-01 PROCEDURE — 83540 ASSAY OF IRON: CPT | Performed by: INTERNAL MEDICINE

## 2017-01-01 PROCEDURE — 77030028837 HC SYR ANGI PWR INJ COEU -A: Performed by: SURGERY

## 2017-01-01 PROCEDURE — 04V03E6 RESTRICT ABD AORTA, BIFURC, W FENESTR DEV 1 OR 2, PERC: ICD-10-PCS | Performed by: SURGERY

## 2017-01-01 PROCEDURE — 77030004530 HC CATH ANGI DX IMGR BSC -A: Performed by: SURGERY

## 2017-01-01 PROCEDURE — 77030034850: Performed by: SURGERY

## 2017-01-01 PROCEDURE — 77030013797 HC KT TRNSDUC PRSSR EDWD -A: Performed by: SURGERY

## 2017-01-01 PROCEDURE — 84100 ASSAY OF PHOSPHORUS: CPT | Performed by: INTERNAL MEDICINE

## 2017-01-01 PROCEDURE — 85027 COMPLETE CBC AUTOMATED: CPT | Performed by: SURGERY

## 2017-01-01 PROCEDURE — 83735 ASSAY OF MAGNESIUM: CPT | Performed by: INTERNAL MEDICINE

## 2017-01-01 PROCEDURE — 74011250636 HC RX REV CODE- 250/636

## 2017-01-01 PROCEDURE — 77030010507 HC ADH SKN DERMBND J&J -B: Performed by: SURGERY

## 2017-01-01 PROCEDURE — 96365 THER/PROPH/DIAG IV INF INIT: CPT

## 2017-01-01 PROCEDURE — 83970 ASSAY OF PARATHORMONE: CPT | Performed by: INTERNAL MEDICINE

## 2017-01-01 PROCEDURE — 77030019896 HC KT ARTERIAL LN TELE -B: Performed by: SURGERY

## 2017-01-01 PROCEDURE — 74011250637 HC RX REV CODE- 250/637: Performed by: SURGERY

## 2017-01-01 PROCEDURE — 77030018719 HC DRSG PTCH ANTIMIC J&J -A: Performed by: ANESTHESIOLOGY

## 2017-01-01 PROCEDURE — 83880 ASSAY OF NATRIURETIC PEPTIDE: CPT | Performed by: INTERNAL MEDICINE

## 2017-01-01 PROCEDURE — C1894 INTRO/SHEATH, NON-LASER: HCPCS | Performed by: SURGERY

## 2017-01-01 PROCEDURE — 77030018390 HC SPNG HEMSTAT2 J&J -B: Performed by: SURGERY

## 2017-01-01 PROCEDURE — 82728 ASSAY OF FERRITIN: CPT | Performed by: INTERNAL MEDICINE

## 2017-01-01 PROCEDURE — 77030026918 HC ADMN ST IV BLD BD -A: Performed by: ANESTHESIOLOGY

## 2017-01-01 PROCEDURE — P9045 ALBUMIN (HUMAN), 5%, 250 ML: HCPCS

## 2017-01-01 PROCEDURE — 82947 ASSAY GLUCOSE BLOOD QUANT: CPT

## 2017-01-01 PROCEDURE — 85347 COAGULATION TIME ACTIVATED: CPT

## 2017-01-01 PROCEDURE — 86900 BLOOD TYPING SEROLOGIC ABO: CPT | Performed by: SURGERY

## 2017-01-01 PROCEDURE — 80053 COMPREHEN METABOLIC PANEL: CPT | Performed by: EMERGENCY MEDICINE

## 2017-01-01 PROCEDURE — 81001 URINALYSIS AUTO W/SCOPE: CPT | Performed by: EMERGENCY MEDICINE

## 2017-01-01 PROCEDURE — 77030020788: Performed by: SURGERY

## 2017-01-01 PROCEDURE — B41C1ZZ FLUOROSCOPY OF PELVIC ARTERIES USING LOW OSMOLAR CONTRAST: ICD-10-PCS | Performed by: SURGERY

## 2017-01-01 PROCEDURE — 80069 RENAL FUNCTION PANEL: CPT | Performed by: INTERNAL MEDICINE

## 2017-01-01 PROCEDURE — C1753 CATH, INTRAVAS ULTRASOUND: HCPCS | Performed by: SURGERY

## 2017-01-01 PROCEDURE — 77030011640 HC PAD GRND REM COVD -A: Performed by: SURGERY

## 2017-01-01 PROCEDURE — 82043 UR ALBUMIN QUANTITATIVE: CPT | Performed by: INTERNAL MEDICINE

## 2017-01-01 PROCEDURE — 77030002996 HC SUT SLK J&J -A: Performed by: ANESTHESIOLOGY

## 2017-01-01 PROCEDURE — 76060000037 HC ANESTHESIA 3 TO 3.5 HR: Performed by: SURGERY

## 2017-01-01 PROCEDURE — 85018 HEMOGLOBIN: CPT | Performed by: INTERNAL MEDICINE

## 2017-01-01 PROCEDURE — C1768 GRAFT, VASCULAR: HCPCS | Performed by: SURGERY

## 2017-01-01 PROCEDURE — 77030008683 HC TU ET CUF COVD -A: Performed by: ANESTHESIOLOGY

## 2017-01-01 PROCEDURE — 85025 COMPLETE CBC W/AUTO DIFF WBC: CPT | Performed by: INTERNAL MEDICINE

## 2017-01-01 PROCEDURE — 74011250636 HC RX REV CODE- 250/636: Performed by: NURSE ANESTHETIST, CERTIFIED REGISTERED

## 2017-01-01 PROCEDURE — 74011250636 HC RX REV CODE- 250/636: Performed by: EMERGENCY MEDICINE

## 2017-01-01 PROCEDURE — 75953 XR ENDOVASCULAR PLACMNT AAA: CPT

## 2017-01-01 PROCEDURE — 77030013797 HC KT TRNSDUC PRSSR EDWD -A: Performed by: ANESTHESIOLOGY

## 2017-01-01 PROCEDURE — 74011000250 HC RX REV CODE- 250: Performed by: EMERGENCY MEDICINE

## 2017-01-01 PROCEDURE — 77030018673: Performed by: SURGERY

## 2017-01-01 PROCEDURE — 77030018836 HC SOL IRR NACL ICUM -A: Performed by: SURGERY

## 2017-01-01 PROCEDURE — 77030002986 HC SUT PROL J&J -A: Performed by: SURGERY

## 2017-01-01 PROCEDURE — B4171ZZ FLUOROSCOPY OF LEFT RENAL ARTERY USING LOW OSMOLAR CONTRAST: ICD-10-PCS | Performed by: SURGERY

## 2017-01-01 PROCEDURE — 36600 WITHDRAWAL OF ARTERIAL BLOOD: CPT

## 2017-01-01 PROCEDURE — 74011000250 HC RX REV CODE- 250: Performed by: SURGERY

## 2017-01-01 PROCEDURE — C1725 CATH, TRANSLUMIN NON-LASER: HCPCS | Performed by: SURGERY

## 2017-01-01 PROCEDURE — 74011000258 HC RX REV CODE- 258: Performed by: SURGERY

## 2017-01-01 PROCEDURE — 82306 VITAMIN D 25 HYDROXY: CPT | Performed by: INTERNAL MEDICINE

## 2017-01-01 PROCEDURE — 77030010514 HC APPL CLP LIG COVD -B: Performed by: SURGERY

## 2017-01-01 PROCEDURE — 80061 LIPID PANEL: CPT | Performed by: INTERNAL MEDICINE

## 2017-01-01 PROCEDURE — 96375 TX/PRO/DX INJ NEW DRUG ADDON: CPT

## 2017-01-01 PROCEDURE — B4101ZZ FLUOROSCOPY OF ABDOMINAL AORTA USING LOW OSMOLAR CONTRAST: ICD-10-PCS | Performed by: SURGERY

## 2017-01-01 PROCEDURE — 74011250636 HC RX REV CODE- 250/636: Performed by: PHYSICIAN ASSISTANT

## 2017-01-01 PROCEDURE — 83690 ASSAY OF LIPASE: CPT | Performed by: EMERGENCY MEDICINE

## 2017-01-01 PROCEDURE — 74011000250 HC RX REV CODE- 250

## 2017-01-01 PROCEDURE — 65620000000 HC RM CCU GENERAL

## 2017-01-01 PROCEDURE — 82570 ASSAY OF URINE CREATININE: CPT | Performed by: INTERNAL MEDICINE

## 2017-01-01 PROCEDURE — 85025 COMPLETE CBC W/AUTO DIFF WBC: CPT | Performed by: EMERGENCY MEDICINE

## 2017-01-01 PROCEDURE — 93306 TTE W/DOPPLER COMPLETE: CPT

## 2017-01-01 PROCEDURE — 77030020782 HC GWN BAIR PAWS FLX 3M -B: Performed by: SURGERY

## 2017-01-01 PROCEDURE — 76010000202 HC CV SURG 3 TO 3.5 HR INTENSV-TIER 1: Performed by: SURGERY

## 2017-01-01 PROCEDURE — 77030013079 HC BLNKT BAIR HGGR 3M -A: Performed by: ANESTHESIOLOGY

## 2017-01-01 PROCEDURE — 74011000258 HC RX REV CODE- 258

## 2017-01-01 PROCEDURE — 99282 EMERGENCY DEPT VISIT SF MDM: CPT

## 2017-01-01 PROCEDURE — 74176 CT ABD & PELVIS W/O CONTRAST: CPT

## 2017-01-01 PROCEDURE — 77030008771 HC TU NG SALEM SUMP -A: Performed by: ANESTHESIOLOGY

## 2017-01-01 PROCEDURE — 80048 BASIC METABOLIC PNL TOTAL CA: CPT | Performed by: SURGERY

## 2017-01-01 PROCEDURE — 74177 CT ABD & PELVIS W/CONTRAST: CPT

## 2017-01-01 PROCEDURE — 77030003390 HC NDL ANGI MRTM -A: Performed by: SURGERY

## 2017-01-01 PROCEDURE — 77030019908 HC STETH ESOPH SIMS -A: Performed by: ANESTHESIOLOGY

## 2017-01-01 PROCEDURE — 71020 XR CHEST PA LAT: CPT

## 2017-01-01 PROCEDURE — B44BZZ3 ULTRASONOGRAPHY OF OTHER INTRA-ABDOMINAL ARTERIES, INTRAVASCULAR: ICD-10-PCS | Performed by: SURGERY

## 2017-01-01 PROCEDURE — 77030002996 HC SUT SLK J&J -A: Performed by: SURGERY

## 2017-01-01 PROCEDURE — B24BZZ4 ULTRASONOGRAPHY OF HEART WITH AORTA, TRANSESOPHAGEAL: ICD-10-PCS | Performed by: ANESTHESIOLOGY

## 2017-01-01 PROCEDURE — 77030011985 HC AIRWY NP COVD -A: Performed by: ANESTHESIOLOGY

## 2017-01-01 PROCEDURE — 74011636320 HC RX REV CODE- 636/320: Performed by: SURGERY

## 2017-01-01 PROCEDURE — 74011250637 HC RX REV CODE- 250/637

## 2017-01-01 PROCEDURE — 74011250637 HC RX REV CODE- 250/637: Performed by: NURSE ANESTHETIST, CERTIFIED REGISTERED

## 2017-01-01 DEVICE — STENT GRAFT ETLW1616C124E ENDUR II LIMB
Type: IMPLANTABLE DEVICE | Site: AORTA | Status: FUNCTIONAL
Brand: ENDURANT® II

## 2017-01-01 DEVICE — STENT GRAFT ETLW1620C82E ENDUR II LIMB
Type: IMPLANTABLE DEVICE | Site: AORTA | Status: FUNCTIONAL
Brand: ENDURANT® II

## 2017-01-01 DEVICE — STENT GRAFT ESBF3614C103E ENDUR IIS BIF
Type: IMPLANTABLE DEVICE | Site: AORTA | Status: FUNCTIONAL
Brand: ENDURANT® IIS

## 2017-01-01 DEVICE — STENT GRAFT ETLW1620C124E ENDUR II LIMB
Type: IMPLANTABLE DEVICE | Site: AORTA | Status: FUNCTIONAL
Brand: ENDURANT® II

## 2017-01-01 RX ORDER — METRONIDAZOLE 500 MG/1
500 TABLET ORAL 2 TIMES DAILY
Qty: 20 TAB | Refills: 0 | Status: SHIPPED | OUTPATIENT
Start: 2017-01-01 | End: 2017-01-01

## 2017-01-01 RX ORDER — ONDANSETRON 2 MG/ML
4 INJECTION INTRAMUSCULAR; INTRAVENOUS
Status: COMPLETED | OUTPATIENT
Start: 2017-01-01 | End: 2017-01-01

## 2017-01-01 RX ORDER — CIPROFLOXACIN 500 MG/1
500 TABLET ORAL 2 TIMES DAILY
Qty: 20 TAB | Refills: 0 | Status: SHIPPED | OUTPATIENT
Start: 2017-01-01 | End: 2017-01-01

## 2017-01-01 RX ORDER — CALCIUM CHLORIDE INJECTION 100 MG/ML
INJECTION, SOLUTION INTRAVENOUS AS NEEDED
Status: DISCONTINUED | OUTPATIENT
Start: 2017-01-01 | End: 2017-01-01 | Stop reason: HOSPADM

## 2017-01-01 RX ORDER — ETOMIDATE 2 MG/ML
INJECTION INTRAVENOUS AS NEEDED
Status: DISCONTINUED | OUTPATIENT
Start: 2017-01-01 | End: 2017-01-01 | Stop reason: HOSPADM

## 2017-01-01 RX ORDER — SODIUM CHLORIDE 0.9 % (FLUSH) 0.9 %
5-10 SYRINGE (ML) INJECTION EVERY 8 HOURS
Status: DISCONTINUED | OUTPATIENT
Start: 2017-01-01 | End: 2017-01-01 | Stop reason: HOSPADM

## 2017-01-01 RX ORDER — CEFAZOLIN SODIUM 2 G/50ML
2 SOLUTION INTRAVENOUS EVERY 8 HOURS
Status: COMPLETED | OUTPATIENT
Start: 2017-01-01 | End: 2017-01-01

## 2017-01-01 RX ORDER — DILTIAZEM HYDROCHLORIDE 240 MG/1
CAPSULE, COATED, EXTENDED RELEASE ORAL
Qty: 90 CAP | Refills: 3 | Status: SHIPPED | OUTPATIENT
Start: 2017-01-01

## 2017-01-01 RX ORDER — LIDOCAINE HYDROCHLORIDE 10 MG/ML
INJECTION, SOLUTION EPIDURAL; INFILTRATION; INTRACAUDAL; PERINEURAL AS NEEDED
Status: DISCONTINUED | OUTPATIENT
Start: 2017-01-01 | End: 2017-01-01 | Stop reason: HOSPADM

## 2017-01-01 RX ORDER — DEXMEDETOMIDINE HYDROCHLORIDE 100 UG/ML
INJECTION, SOLUTION INTRAVENOUS
Status: DISPENSED
Start: 2017-01-01 | End: 2017-01-01

## 2017-01-01 RX ORDER — ACETAMINOPHEN 120 MG/1
SUPPOSITORY RECTAL AS NEEDED
Status: DISCONTINUED | OUTPATIENT
Start: 2017-01-01 | End: 2017-01-01 | Stop reason: HOSPADM

## 2017-01-01 RX ORDER — SUCCINYLCHOLINE CHLORIDE 20 MG/ML
INJECTION INTRAMUSCULAR; INTRAVENOUS AS NEEDED
Status: DISCONTINUED | OUTPATIENT
Start: 2017-01-01 | End: 2017-01-01 | Stop reason: HOSPADM

## 2017-01-01 RX ORDER — IODIXANOL 320 MG/ML
INJECTION, SOLUTION INTRAVASCULAR
Status: DISPENSED
Start: 2017-01-01 | End: 2017-01-01

## 2017-01-01 RX ORDER — ACETAMINOPHEN 650 MG/1
SUPPOSITORY RECTAL
Status: DISPENSED
Start: 2017-01-01 | End: 2017-01-01

## 2017-01-01 RX ORDER — LISINOPRIL 5 MG/1
5 TABLET ORAL DAILY
Status: DISCONTINUED | OUTPATIENT
Start: 2017-01-01 | End: 2017-01-01 | Stop reason: HOSPADM

## 2017-01-01 RX ORDER — HEPARIN SODIUM 200 [USP'U]/100ML
INJECTION, SOLUTION INTRAVENOUS
Status: DISPENSED
Start: 2017-01-01 | End: 2017-01-01

## 2017-01-01 RX ORDER — OXYCODONE AND ACETAMINOPHEN 5; 325 MG/1; MG/1
1 TABLET ORAL
Status: DISCONTINUED | OUTPATIENT
Start: 2017-01-01 | End: 2017-01-01 | Stop reason: HOSPADM

## 2017-01-01 RX ORDER — OXYCODONE AND ACETAMINOPHEN 5; 325 MG/1; MG/1
1-2 TABLET ORAL
Qty: 40 TAB | Refills: 0 | Status: SHIPPED | OUTPATIENT
Start: 2017-01-01

## 2017-01-01 RX ORDER — SODIUM BICARBONATE 1 MEQ/ML
SYRINGE (ML) INTRAVENOUS
Status: DISPENSED
Start: 2017-01-01 | End: 2017-01-01

## 2017-01-01 RX ORDER — SODIUM CHLORIDE 0.9 % (FLUSH) 0.9 %
5-10 SYRINGE (ML) INJECTION AS NEEDED
Status: DISCONTINUED | OUTPATIENT
Start: 2017-01-01 | End: 2017-01-01 | Stop reason: HOSPADM

## 2017-01-01 RX ORDER — HYDROMORPHONE HYDROCHLORIDE 1 MG/ML
0.5 INJECTION, SOLUTION INTRAMUSCULAR; INTRAVENOUS; SUBCUTANEOUS
Status: DISCONTINUED | OUTPATIENT
Start: 2017-01-01 | End: 2017-01-01 | Stop reason: HOSPADM

## 2017-01-01 RX ORDER — FENTANYL CITRATE 50 UG/ML
INJECTION, SOLUTION INTRAMUSCULAR; INTRAVENOUS AS NEEDED
Status: DISCONTINUED | OUTPATIENT
Start: 2017-01-01 | End: 2017-01-01 | Stop reason: HOSPADM

## 2017-01-01 RX ORDER — ALBUMIN HUMAN 50 G/1000ML
SOLUTION INTRAVENOUS
Status: DISCONTINUED | OUTPATIENT
Start: 2017-01-01 | End: 2017-01-01 | Stop reason: HOSPADM

## 2017-01-01 RX ORDER — LIDOCAINE HYDROCHLORIDE 10 MG/ML
INJECTION, SOLUTION EPIDURAL; INFILTRATION; INTRACAUDAL; PERINEURAL
Status: DISPENSED
Start: 2017-01-01 | End: 2017-01-01

## 2017-01-01 RX ORDER — DOXYCYCLINE 100 MG/1
100 CAPSULE ORAL 2 TIMES DAILY
Qty: 20 CAP | Refills: 0 | Status: SHIPPED | OUTPATIENT
Start: 2017-01-01 | End: 2017-01-01

## 2017-01-01 RX ORDER — LIDOCAINE HYDROCHLORIDE 20 MG/ML
JELLY TOPICAL
Status: DISPENSED
Start: 2017-01-01 | End: 2017-01-01

## 2017-01-01 RX ORDER — FUROSEMIDE 40 MG/1
TABLET ORAL
Qty: 90 TAB | Refills: 0 | Status: SHIPPED | OUTPATIENT
Start: 2017-01-01 | End: 2017-01-01 | Stop reason: ALTCHOICE

## 2017-01-01 RX ORDER — MAGNESIUM SULFATE 100 %
4 CRYSTALS MISCELLANEOUS AS NEEDED
Status: DISCONTINUED | OUTPATIENT
Start: 2017-01-01 | End: 2017-01-01 | Stop reason: HOSPADM

## 2017-01-01 RX ORDER — ONDANSETRON 2 MG/ML
INJECTION INTRAMUSCULAR; INTRAVENOUS AS NEEDED
Status: DISCONTINUED | OUTPATIENT
Start: 2017-01-01 | End: 2017-01-01 | Stop reason: HOSPADM

## 2017-01-01 RX ORDER — CISATRACURIUM BESYLATE 2 MG/ML
INJECTION, SOLUTION INTRAVENOUS
Status: DISPENSED
Start: 2017-01-01 | End: 2017-01-01

## 2017-01-01 RX ORDER — HEPARIN SODIUM 1000 [USP'U]/ML
INJECTION, SOLUTION INTRAVENOUS; SUBCUTANEOUS AS NEEDED
Status: DISCONTINUED | OUTPATIENT
Start: 2017-01-01 | End: 2017-01-01 | Stop reason: HOSPADM

## 2017-01-01 RX ORDER — CIPROFLOXACIN 2 MG/ML
400 INJECTION, SOLUTION INTRAVENOUS
Status: COMPLETED | OUTPATIENT
Start: 2017-01-01 | End: 2017-01-01

## 2017-01-01 RX ORDER — TRAMADOL HYDROCHLORIDE 50 MG/1
50 TABLET ORAL
Qty: 40 TAB | Refills: 3 | Status: SHIPPED | OUTPATIENT
Start: 2017-01-01 | End: 2017-01-01 | Stop reason: SINTOL

## 2017-01-01 RX ORDER — NALOXONE HYDROCHLORIDE 0.4 MG/ML
0.4 INJECTION, SOLUTION INTRAMUSCULAR; INTRAVENOUS; SUBCUTANEOUS AS NEEDED
Status: DISCONTINUED | OUTPATIENT
Start: 2017-01-01 | End: 2017-01-01 | Stop reason: HOSPADM

## 2017-01-01 RX ORDER — DILTIAZEM HYDROCHLORIDE 240 MG/1
240 CAPSULE, COATED, EXTENDED RELEASE ORAL DAILY
Status: DISCONTINUED | OUTPATIENT
Start: 2017-01-01 | End: 2017-01-01 | Stop reason: HOSPADM

## 2017-01-01 RX ORDER — DICYCLOMINE HYDROCHLORIDE 20 MG/1
20 TABLET ORAL EVERY 6 HOURS
Qty: 20 TAB | Refills: 0 | Status: SHIPPED | OUTPATIENT
Start: 2017-01-01 | End: 2017-01-01

## 2017-01-01 RX ORDER — HYDRALAZINE HYDROCHLORIDE 20 MG/ML
10 INJECTION INTRAMUSCULAR; INTRAVENOUS ONCE
Status: COMPLETED | OUTPATIENT
Start: 2017-01-01 | End: 2017-01-01

## 2017-01-01 RX ORDER — DEXTROSE MONOHYDRATE AND SODIUM CHLORIDE 5; .45 G/100ML; G/100ML
75 INJECTION, SOLUTION INTRAVENOUS CONTINUOUS
Status: DISPENSED | OUTPATIENT
Start: 2017-01-01 | End: 2017-01-01

## 2017-01-01 RX ORDER — CHLORTHALIDONE 25 MG/1
12.5 TABLET ORAL DAILY
Qty: 45 TAB | Refills: 3 | Status: SHIPPED | OUTPATIENT
Start: 2017-01-01

## 2017-01-01 RX ORDER — IODIXANOL 320 MG/ML
INJECTION, SOLUTION INTRAVASCULAR AS NEEDED
Status: DISCONTINUED | OUTPATIENT
Start: 2017-01-01 | End: 2017-01-01 | Stop reason: HOSPADM

## 2017-01-01 RX ORDER — LIDOCAINE HYDROCHLORIDE 10 MG/ML
0.1 INJECTION, SOLUTION EPIDURAL; INFILTRATION; INTRACAUDAL; PERINEURAL AS NEEDED
Status: DISCONTINUED | OUTPATIENT
Start: 2017-01-01 | End: 2017-01-01 | Stop reason: HOSPADM

## 2017-01-01 RX ORDER — CHOLECALCIFEROL (VITAMIN D3) 125 MCG
2000 CAPSULE ORAL DAILY
COMMUNITY

## 2017-01-01 RX ORDER — DEXMEDETOMIDINE HYDROCHLORIDE 4 UG/ML
INJECTION, SOLUTION INTRAVENOUS
Status: DISCONTINUED | OUTPATIENT
Start: 2017-01-01 | End: 2017-01-01 | Stop reason: HOSPADM

## 2017-01-01 RX ORDER — SODIUM BICARBONATE 1 MEQ/ML
SYRINGE (ML) INTRAVENOUS AS NEEDED
Status: DISCONTINUED | OUTPATIENT
Start: 2017-01-01 | End: 2017-01-01 | Stop reason: HOSPADM

## 2017-01-01 RX ORDER — CEFAZOLIN SODIUM 2 G/50ML
2 SOLUTION INTRAVENOUS EVERY 8 HOURS
Status: DISCONTINUED | OUTPATIENT
Start: 2017-01-01 | End: 2017-01-01

## 2017-01-01 RX ORDER — HEPARIN SODIUM 200 [USP'U]/100ML
INJECTION, SOLUTION INTRAVENOUS
Status: DISCONTINUED | OUTPATIENT
Start: 2017-01-01 | End: 2017-01-01 | Stop reason: HOSPADM

## 2017-01-01 RX ORDER — CEFAZOLIN SODIUM 2 G/50ML
2 SOLUTION INTRAVENOUS EVERY 8 HOURS
Status: DISCONTINUED | OUTPATIENT
Start: 2017-01-01 | End: 2017-01-01 | Stop reason: HOSPADM

## 2017-01-01 RX ORDER — FAMOTIDINE 10 MG/ML
20 INJECTION INTRAVENOUS
Status: COMPLETED | OUTPATIENT
Start: 2017-01-01 | End: 2017-01-01

## 2017-01-01 RX ORDER — FLUTICASONE PROPIONATE 50 MCG
SPRAY, SUSPENSION (ML) NASAL
Qty: 1 BOTTLE | Refills: 2 | Status: SHIPPED | OUTPATIENT
Start: 2017-01-01

## 2017-01-01 RX ORDER — DEXTROSE 50 % IN WATER (D50W) INTRAVENOUS SYRINGE
25-50 AS NEEDED
Status: DISCONTINUED | OUTPATIENT
Start: 2017-01-01 | End: 2017-01-01 | Stop reason: HOSPADM

## 2017-01-01 RX ORDER — SODIUM CHLORIDE, SODIUM LACTATE, POTASSIUM CHLORIDE, CALCIUM CHLORIDE 600; 310; 30; 20 MG/100ML; MG/100ML; MG/100ML; MG/100ML
75 INJECTION, SOLUTION INTRAVENOUS CONTINUOUS
Status: DISCONTINUED | OUTPATIENT
Start: 2017-01-01 | End: 2017-01-01 | Stop reason: HOSPADM

## 2017-01-01 RX ORDER — FAMOTIDINE 20 MG/1
20 TABLET, FILM COATED ORAL ONCE
Status: COMPLETED | OUTPATIENT
Start: 2017-01-01 | End: 2017-01-01

## 2017-01-01 RX ORDER — PROPOFOL 10 MG/ML
INJECTION, EMULSION INTRAVENOUS AS NEEDED
Status: DISCONTINUED | OUTPATIENT
Start: 2017-01-01 | End: 2017-01-01 | Stop reason: HOSPADM

## 2017-01-01 RX ORDER — DEXAMETHASONE SODIUM PHOSPHATE 4 MG/ML
INJECTION, SOLUTION INTRA-ARTICULAR; INTRALESIONAL; INTRAMUSCULAR; INTRAVENOUS; SOFT TISSUE AS NEEDED
Status: DISCONTINUED | OUTPATIENT
Start: 2017-01-01 | End: 2017-01-01 | Stop reason: HOSPADM

## 2017-01-01 RX ORDER — MORPHINE SULFATE 2 MG/ML
2 INJECTION, SOLUTION INTRAMUSCULAR; INTRAVENOUS
Status: COMPLETED | OUTPATIENT
Start: 2017-01-01 | End: 2017-01-01

## 2017-01-01 RX ADMIN — CEFAZOLIN SODIUM 2 G: 2 SOLUTION INTRAVENOUS at 15:16

## 2017-01-01 RX ADMIN — SODIUM CHLORIDE, SODIUM LACTATE, POTASSIUM CHLORIDE, AND CALCIUM CHLORIDE 75 ML/HR: 600; 310; 30; 20 INJECTION, SOLUTION INTRAVENOUS at 07:48

## 2017-01-01 RX ADMIN — Medication 25 MEQ: at 10:44

## 2017-01-01 RX ADMIN — FAMOTIDINE 20 MG: 20 TABLET ORAL at 07:30

## 2017-01-01 RX ADMIN — ALBUMIN HUMAN 500 ML/HR: 50 SOLUTION INTRAVENOUS at 09:00

## 2017-01-01 RX ADMIN — Medication 10 ML: at 07:40

## 2017-01-01 RX ADMIN — CIPROFLOXACIN 400 MG: 2 INJECTION, SOLUTION INTRAVENOUS at 00:55

## 2017-01-01 RX ADMIN — FENTANYL CITRATE 100 MCG: 50 INJECTION, SOLUTION INTRAMUSCULAR; INTRAVENOUS at 08:34

## 2017-01-01 RX ADMIN — CALCIUM CHLORIDE INJECTION 500 MG: 100 INJECTION, SOLUTION INTRAVENOUS at 09:15

## 2017-01-01 RX ADMIN — CEFAZOLIN SODIUM 2 G: 2 SOLUTION INTRAVENOUS at 23:50

## 2017-01-01 RX ADMIN — DEXMEDETOMIDINE HYDROCHLORIDE 0.5 MCG/KG/HR: 4 INJECTION, SOLUTION INTRAVENOUS at 08:25

## 2017-01-01 RX ADMIN — SODIUM CHLORIDE, SODIUM LACTATE, POTASSIUM CHLORIDE, AND CALCIUM CHLORIDE: 600; 310; 30; 20 INJECTION, SOLUTION INTRAVENOUS at 08:15

## 2017-01-01 RX ADMIN — DILTIAZEM HYDROCHLORIDE 240 MG: 240 CAPSULE, EXTENDED RELEASE ORAL at 08:05

## 2017-01-01 RX ADMIN — Medication 50 MEQ: at 09:53

## 2017-01-01 RX ADMIN — ACETAMINOPHEN 650 MG: 120 SUPPOSITORY RECTAL at 08:40

## 2017-01-01 RX ADMIN — FAMOTIDINE 20 MG: 10 INJECTION, SOLUTION INTRAVENOUS at 21:06

## 2017-01-01 RX ADMIN — Medication 10 ML: at 07:41

## 2017-01-01 RX ADMIN — HEPARIN SODIUM 2000 UNITS: 1000 INJECTION, SOLUTION INTRAVENOUS; SUBCUTANEOUS at 09:48

## 2017-01-01 RX ADMIN — DEXAMETHASONE SODIUM PHOSPHATE 4 MG: 4 INJECTION, SOLUTION INTRA-ARTICULAR; INTRALESIONAL; INTRAMUSCULAR; INTRAVENOUS; SOFT TISSUE at 09:00

## 2017-01-01 RX ADMIN — Medication 10 ML: at 22:00

## 2017-01-01 RX ADMIN — SUCCINYLCHOLINE CHLORIDE 120 MG: 20 INJECTION INTRAMUSCULAR; INTRAVENOUS at 08:36

## 2017-01-01 RX ADMIN — ONDANSETRON 4 MG: 2 INJECTION INTRAMUSCULAR; INTRAVENOUS at 11:10

## 2017-01-01 RX ADMIN — CEFAZOLIN SODIUM 2 G: 2 SOLUTION INTRAVENOUS at 08:45

## 2017-01-01 RX ADMIN — Medication 10 ML: at 05:12

## 2017-01-01 RX ADMIN — FENTANYL CITRATE 100 MCG: 50 INJECTION, SOLUTION INTRAMUSCULAR; INTRAVENOUS at 09:03

## 2017-01-01 RX ADMIN — HYDRALAZINE HYDROCHLORIDE 10 MG: 20 INJECTION INTRAMUSCULAR; INTRAVENOUS at 01:24

## 2017-01-01 RX ADMIN — PROPOFOL 10 MG: 10 INJECTION, EMULSION INTRAVENOUS at 08:25

## 2017-01-01 RX ADMIN — FENTANYL CITRATE 50 MCG: 50 INJECTION, SOLUTION INTRAMUSCULAR; INTRAVENOUS at 11:25

## 2017-01-01 RX ADMIN — LISINOPRIL 5 MG: 5 TABLET ORAL at 08:05

## 2017-01-01 RX ADMIN — DEXTROSE MONOHYDRATE AND SODIUM CHLORIDE 75 ML/HR: 5; .45 INJECTION, SOLUTION INTRAVENOUS at 12:00

## 2017-01-01 RX ADMIN — HEPARIN SODIUM 2000 UNITS: 1000 INJECTION, SOLUTION INTRAVENOUS; SUBCUTANEOUS at 10:34

## 2017-01-01 RX ADMIN — PROPOFOL 20 MG: 10 INJECTION, EMULSION INTRAVENOUS at 08:36

## 2017-01-01 RX ADMIN — ONDANSETRON 4 MG: 2 INJECTION INTRAMUSCULAR; INTRAVENOUS at 00:54

## 2017-01-01 RX ADMIN — OXYCODONE AND ACETAMINOPHEN 1 TABLET: 5; 325 TABLET ORAL at 01:26

## 2017-01-01 RX ADMIN — HEPARIN SODIUM 10000 UNITS: 1000 INJECTION, SOLUTION INTRAVENOUS; SUBCUTANEOUS at 09:34

## 2017-01-01 RX ADMIN — CEFAZOLIN SODIUM 2 G: 2 SOLUTION INTRAVENOUS at 07:33

## 2017-01-01 RX ADMIN — ETOMIDATE 20 MG: 2 INJECTION INTRAVENOUS at 08:34

## 2017-01-01 RX ADMIN — Medication 10 ML: at 15:16

## 2017-01-01 RX ADMIN — Medication 2 MG: at 00:55

## 2017-02-06 NOTE — TELEPHONE ENCOUNTER
Patient is requesting to speak with Huong Stroud. He has an appt with Dr. Misti Davis today, but states he spoke with Lorene Dickens last week about an issue and would like to speak with him today, if possible. Please advise.

## 2017-02-06 NOTE — PROGRESS NOTES
Patient: Alfonso Lowry                MRN: [id number]       SSN: [id number]  YOB: 1935        AGE: [de-identified] y.o. SEX: male  There is no height or weight on file to calculate BMI. PCP: Liliya Blevins MD  02/06/17      No chief complaint on file. HISTORY OF PRESENT ILLNESS: Alfonso Lowry is a [de-identified] y.o. male who presents to the office for left knee pain for 3 months. It began when he struck his knee against he bumper if his car. He has been taking tylenol which helps his pain. The pain does not limit his activity but states he feels as if something is slipping out of place when he walks. Review of Systems   Constitutional: Negative. HENT: Negative. Eyes: Negative. Respiratory: Negative. Cardiovascular: Negative. Gastrointestinal: Negative. Genitourinary: Negative. Musculoskeletal: Positive for joint pain (left knee). Skin: Negative. Neurological: Negative. Endo/Heme/Allergies: Negative. Psychiatric/Behavioral: Negative. Social History     Social History    Marital status:      Spouse name: N/A    Number of children: N/A    Years of education: N/A     Occupational History    Not on file.      Social History Main Topics    Smoking status: Never Smoker    Smokeless tobacco: Never Used    Alcohol use No    Drug use: No    Sexual activity: Not on file     Other Topics Concern    Not on file     Social History Narrative        Past Medical History   Diagnosis Date    [facility] 4/17/2014     3.4 cm,  5/12    Allergic rhinitis     Anemia NEC      mildly    Chronic LBP     Gross hematuria      rarely    Headache(784.0)     HTN (hypertension) 6/7/2011    Hypercholesterolemia     Hyperlipidemia 6/7/2011    Obesity     Polymyalgia ([facility] 6/7/2011        Allergies   Allergen Reactions    Aspirin Other (comments)     Gi side effects    Nsaids (Non-Steroidal Anti-Inflammatory Drug) Other (comments)     Gi upset         Current Outpatient Prescriptions   Medication Sig    chlorthalidone (HYGROTEN) 25 mg tablet Take 0.5 Tabs by mouth daily.  diltiazem CD (CARDIZEM CD) 240 mg ER capsule Take 1 Cap by mouth daily.  pantoprazole (PROTONIX) 40 mg tablet Take 1 Tab by mouth daily.  fluticasone (FLONASE) 50 mcg/actuation nasal spray 2 sprays in each nostril daily    lisinopril (PRINIVIL, ZESTRIL) 5 mg tablet Take  by mouth daily.  cyanocobalamin (VITAMIN B-12) 500 mcg tablet Take 1,000 mcg by mouth daily.  ferrous sulfate (IRON) 325 mg (65 mg iron) tablet Take  by mouth Daily (before breakfast). No current facility-administered medications for this visit. Physical Exam  Constitutional: he is oriented to person, place, and time and well-developed, well-nourished, and in no distress. No distress. HENT:   Head: Normocephalic and atraumatic. Right Ear: Hearing normal.   Left Ear: Hearing normal.   Nose: Nose normal.   Eyes: Conjunctivae, EOM and lids are normal. Pupils are equal, round, and reactive to light. Neck: Trachea normal.   Pulmonary/Chest: Effort normal and breath sounds normal. No respiratory distress. Abdominal: Soft. Neurological: he is alert and oriented to person, place, and time. Skin: Skin is warm, dry and intact. he is not diaphoretic. Psychiatric: Affect normal.   Nursing note and vitals reviewed. Ortho Exam   Left Knee shows soft tissue swelling. TTP along medial joint line. Ligaments are stable and pain free when tested. Knee extension agaisint resistance caused no pain. RADIOGRAPHS:   02/06/17 XR Left knee WO CONT  Standing AP and tunnel views shows OA of the left knee. IMPRESSION & PLAN: I feel the source of his discomfort is knee arthritis. I offered a cortisone injectio, patient wants to hold off. He is unable to take NSAIDS because of kidney problems. Ultimately he will alternate Tylenol and Tramadol.  If this does not relieve his pain, he will consider the cortisone injection. I will see him back prn.       Scribed by Eduin Morales ([address] [place] [address] [address]) as dictated by Verna Agustin MD.

## 2017-02-07 PROBLEM — R60.0 BILATERAL EDEMA OF LOWER EXTREMITY: Status: ACTIVE | Noted: 2017-01-01

## 2017-02-07 NOTE — PROGRESS NOTES
Shae Villarreal 1935, is a [de-identified] y.o. male, who is seen today for evaluation of lower extremity edema with redness, dyspnea, cough and nasal congestion postnasal drip. He says that he has had lower extremity edema for some time but notes that by the end of the day it is getting more red the lower legs, right greater than left. This seems to be more prominent especially the last 2 weeks. He does have some mild dyspnea on exertion and when he got up this morning he was more dyspneic than usual.  He had seen Dr. Pavithra George yesterday for DJD and was given tramadol and to take one half tablet at bedtime and that did help a little bit and he thought the dyspnea could be related to the medicine. He has not had orthopnea or PND. He has had some coughing for a couple of months as well as nasal congestion and Flonase does not seem to be helping a whole lot he would like a refill. He is seemingly white secretions from his nose and does not produce any sputum. He is not coughing frequently. Past Medical History   Diagnosis Date    Abdominal aortic aneurysm ([address] 4/17/2014     3.4 cm,  5/12    Allergic rhinitis     Anemia NEC      mildly    Chronic LBP     Gross hematuria      rarely    Headache(784.0)     HTN (hypertension) 6/7/2011    Hypercholesterolemia     Hyperlipidemia 6/7/2011    Obesity     Polymyalgia ([facility] 6/7/2011     Current Outpatient Prescriptions   Medication Sig Dispense Refill    fluticasone (FLONASE) 50 mcg/actuation nasal spray 2 sprays in each nostril daily 1 Bottle 2    furosemide (LASIX) 40 mg tablet 1 tablet by mouth each morning 90 Tab 0    traMADol (ULTRAM) 50 mg tablet Take 1 Tab by mouth every six (6) hours as needed for Pain. Max Daily Amount: 200 mg. 40 Tab 3    chlorthalidone (HYGROTEN) 25 mg tablet Take 0.5 Tabs by mouth daily. 45 Tab 3    diltiazem CD (CARDIZEM CD) 240 mg ER capsule Take 1 Cap by mouth daily.  90 Cap 3    pantoprazole (PROTONIX) 40 mg tablet Take 1 Tab by mouth daily. 30 Tab 11    lisinopril (PRINIVIL, ZESTRIL) 5 mg tablet Take  by mouth daily.  cyanocobalamin (VITAMIN B-12) 500 mcg tablet Take 1,000 mcg by mouth daily.  ferrous sulfate (IRON) 325 mg (65 mg iron) tablet Take  by mouth Daily (before breakfast). Visit Vitals    /64    Pulse 67    Temp 98.2 °F (36.8 °C) (Oral)    Resp 14    Ht 5' 10\" (1.778 m)    Wt 225 lb 3.2 oz (102.2 kg)    SpO2 97%    BMI 32.31 kg/m2     Nasal passages reveal some clear to slightly white tenacious secretions. Pharynx reveals no redness exudate or drainage. No JVD or HJR. Carotids are 2+ without bruits. Lungs are clear to percussion. Good breath sounds but there are crackles deep in the left base about one third of the way up from the base on the right. No wheezing. No coughing with forced expiration. Heart reveals a regular rhythm with no murmur gallop click or rub. Apical impulse is not palpable. Abdomen is soft and nontender with no hepatosplenomegaly or masses and no bruits. Lower extremities reveal 3+-4+ edema in both lower legs with a very slight redness in the lowermost portion of the legs and the edema is very slightly worse on the right. No calf tenderness. Pulses are intact. Assessment: #1.  Lower extremity edema but also crackles in the lungs suggesting probable CHF. This is been slowly progressive as far as his symptoms of edema and dyspnea. We will check proBNP and also other laboratory at this time. He will continue chlorthalidone 12.5 mg daily but also will be started on furosemide 40 mg each morning but will also take 40 mg this evening when he gets the prescription. He understands. It is also possible that diltiazem contributes a little bit to the edema but for now that will be continued. If he does have CHF as evidenced by proBNP resolution of findings and symptoms with diuresis probably the diltiazem should be discontinued.   #2.  Cough and some dyspnea which may be multifactorial but may be partly related to CHF. He may also have allergy is a significant component of his cough and certainly was nasal congestion and findings on nasal exam.  For now he will continue Flonase but no other medication will be added. #3. Hypertension is controlled. For now he will continue chlorthalidone 12.5 mg daily lisinopril 5 mg daily and diltiazem 240 mg daily. #4.  DJD, he did get some benefit from tramadol and I explained to him that is highly unlikely tramadol had anything to do with his dyspnea so he may continue this and either 25 or 50 mg every 8 hours as needed for pain. He does have a follow-up appointment already scheduled for about 2 weeks from now and will keep that appointment. Terry Vasquez MD FACP    Please note: This document has been produced using voice recognition software. Unrecognized errors in transcription may be present.

## 2017-02-07 NOTE — MR AVS SNAPSHOT
Visit Information Date & Time Provider Department Dept. Phone Encounter #  
 2/7/2017  3:30 PM María Elena Toth MD Internist of [address] [id number] Your Appointments 2/10/2017  8:15 AM  
LAB with [facility] NURSE VISIT Internist of [facility] ([address]) Appt Note: labs 6mos. rm; labs 6mos. rm  
 [address], [address] [facility] [address] [place]  
  
   
 [address], [address]  
  
    
 2/17/2017  1:30 PM  
PHYSICAL with María Elena Toth MD  
Internist of [address] [address] [facility]) Appt Note: rpe 6mos. rm; rpe 6mos. rm  
 [address], [address] [facility] [address] [place]  
  
   
 [address] [facility], [address]  
  
    
 5/31/2017  9:00 AM  
PROCEDURE with [facility]  
[facility] (CL SCHEDULING) Appt Note: AAA 6 [address]  
 [address] [address]  
[phone] [address] [address]  
  
    
 5/31/2017 10:00 AM  
PROCEDURE with [facility]  
[facility] (CL SCHEDULING) Appt Note: CV [address] [address]  
[phone]  
  
    
 6/6/2017 10:15 AM  
Office Visit with Jeannine Shaw MD  
[facility] ([facility] [address] [place]) Appt Note: 6 mnth w prep [address] [address] [phone]  
  
   
 [address] [address] Upcoming Health Maintenance Date Due  
 MEDICARE YEARLY EXAM 1/26/2017 GLAUCOMA SCREENING Q2Y 9/13/2018 DTaP/Tdap/Td series (2 - Td) 10/16/2022 Allergies as of 2/7/2017  Review Complete On: 2/7/2017 By: María Elena Toth MD  
  
 Severity Noted Reaction Type Reactions Aspirin  06/07/2011    Other (comments) Gi side effects  Nsaids (Non-steroidal Anti-inflammatory Drug)  06/07/2011   Intolerance Other (comments) Gi upset Current Immunizations  Reviewed on 1/20/2015 Name Date Hepatitis B Vaccine 1/10/2002, 8/3/2001, 6/6/2001 Influenza Vaccine 10/14/2015 Influenza Vaccine Whole 10/16/2012, 9/21/2011, 9/16/2010, 9/25/2009, 10/3/2008, 10/10/2007, 11/7/2006, 10/13/2005, 11/10/2004, 10/23/2003, 10/17/2002 Pneumococcal Conjugate (PCV-13) 10/14/2015 Pneumococcal Vaccine (Unspecified Type) 10/17/2002 TD Vaccine 10/13/2005, 5/31/1995 TDAP Vaccine 10/16/2012 Not reviewed this visit You Were Diagnosed With   
  
 Codes Comments Dyspnea, unspecified type    -  Primary ICD-10-CM: R06.00 
ICD-9-CM: 786.09 Essential hypertension with goal blood pressure less than 140/90     ICD-10-CM: I10 
ICD-9-CM: 401.9 Bilateral edema of lower extremity     ICD-10-CM: R60.0 ICD-9-CM: 300. 3 Vitals BP Pulse Temp Resp Height(growth percentile) Weight(growth percentile) 132/64 67 98.2 °F (36.8 °C) (Oral) 14 5' 10\" (1.778 m) 225 lb 3.2 oz (102.2 kg) SpO2 BMI Smoking Status 97% 32.31 kg/m2 Never Smoker Vitals History BMI and BSA Data Body Mass Index Body Surface Area  
 32.31 kg/m 2 2.25 m 2 Preferred Pharmacy Pharmacy Name Phone [address] [place] [address], [address] [place] [address] [phone] Your Updated Medication List  
  
   
This list is accurate as of: 2/7/17  4:34 PM.  Always use your most recent med list.  
  
  
  
  
 chlorthalidone 25 mg tablet Commonly known as:  [place]old [place] Take 0.5 Tabs by mouth daily. dilTIAZem  mg ER capsule Commonly known as:  CARDIZEM CD Take 1 Cap by mouth daily. fluticasone 50 mcg/actuation nasal spray Commonly known as:  FLONASE  
2 sprays in each nostril daily  
  
 furosemide 40 mg tablet Commonly known as:  LASIX  
1 tablet by mouth each morning Iron 325 mg (65 mg iron) tablet Generic drug:  ferrous sulfate Take  by mouth Daily (before breakfast). lisinopril 5 mg tablet Commonly known as:  Cecilia Loss Take  by mouth daily. pantoprazole 40 mg tablet Commonly known as:  PROTONIX Take 1 Tab by mouth daily. traMADol 50 mg tablet Commonly known as:  ULTRAM  
Take 1 Tab by mouth every six (6) hours as needed for Pain. Max Daily Amount: 200 mg. VITAMIN B-12 500 mcg tablet Generic drug:  cyanocobalamin Take 1,000 mcg by mouth daily. Prescriptions Printed Refills  
 furosemide (LASIX) 40 mg tablet 0 Si tablet by mouth each morning Class: Print Prescriptions Sent to Pharmacy Refills  
 fluticasone (FLONASE) 50 mcg/actuation nasal spray 2 Si sprays in each nostril daily Class: Normal  
 Pharmacy: [address] [place] [address], [facility] #: [phone] To-Do List   
 Around 2017 Lab:  CBC WITH AUTOMATED DIFF Around 2017 Lab:  METABOLIC PANEL, COMPREHENSIVE Around 2017 Lab:  PRO-BNP Please provide this summary of care documentation to your next provider. Your primary care clinician is listed as Giovanni Pringle. Jarrett Becerra. If you have any questions after today's visit, please call [phone].

## 2017-02-17 PROBLEM — Z71.89 ADVANCE DIRECTIVE DISCUSSED WITH PATIENT: Status: ACTIVE | Noted: 2017-01-01

## 2017-02-17 NOTE — PROGRESS NOTES
Clifton Petersen is a [de-identified] y.o. male and presents for annual Medicare Wellness Visit. Problem List: Reviewed with patient and discussed risk factors.     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    Reflux esophagitis K21.0    Abdominal aortic aneurysm (HCC) I71.4    Chronic renal failure N18.9    Chest pain on exertion R07.9    Essential hypertension I10    Cholecystitis K81.9    PAD (peripheral artery disease) (HCC) I73.9    Chronic renal failure, stage 4 (severe) (HCC) N18.4    Hyperlipidemia LDL goal <100 E78.5    Essential hypertension with goal blood pressure less than 140/90 I10    Bilateral edema of lower extremity R60.0       Current medical providers:  Patient Care Team:  Kellee Ulloa MD as PCP - General (Internal Medicine)  Esha River, RN as Ambulatory Care Navigator (Internal Medicine)  Estella Lundborg, MD (Vascular Surgery)  Kamilah Bangura RN as Ambulatory Care Navigator (Internal Medicine)  Radha Mcneil MD (Nephrology)  MANGO Panchal (Physician Assistant)  Jeni Mccann MD (Ophthalmology)  Susan Figueroa MD (Orthopedic Surgery)    PSH: Reviewed with patient  Past Surgical History   Procedure Laterality Date    Hx hemorrhoidectomy      Endoscopy, colon, diagnostic          SH: Reviewed with patient  Social History   Substance Use Topics    Smoking status: Never Smoker    Smokeless tobacco: Never Used    Alcohol use No       FH: Reviewed with patient  Family History   Problem Relation Age of Onset    Cancer Other      aunt, breast;       Medications/Allergies: Reviewed with patient  Current Outpatient Prescriptions on File Prior to Visit   Medication Sig Dispense Refill    fluticasone (FLONASE) 50 mcg/actuation nasal spray 2 sprays in each nostril daily 1 Bottle 2    furosemide (LASIX) 40 mg tablet 1 tablet by mouth each morning 90 Tab 0    traMADol (ULTRAM) 50 mg tablet Take 1 Tab by mouth every six (6) hours as needed for Pain. Max Daily Amount: 200 mg. 40 Tab 3    chlorthalidone (HYGROTEN) 25 mg tablet Take 0.5 Tabs by mouth daily. 45 Tab 3    diltiazem CD (CARDIZEM CD) 240 mg ER capsule Take 1 Cap by mouth daily. 90 Cap 3    pantoprazole (PROTONIX) 40 mg tablet Take 1 Tab by mouth daily. 30 Tab 11    lisinopril (PRINIVIL, ZESTRIL) 5 mg tablet Take  by mouth daily.  cyanocobalamin (VITAMIN B-12) 500 mcg tablet Take 1,000 mcg by mouth daily.  ferrous sulfate (IRON) 325 mg (65 mg iron) tablet Take  by mouth Daily (before breakfast). No current facility-administered medications on file prior to visit. Allergies   Allergen Reactions    Aspirin Other (comments)     Gi side effects    Nsaids (Non-Steroidal Anti-Inflammatory Drug) Other (comments)     Gi upset       Objective:  Visit Vitals    /62    Pulse 71    Temp 98 °F (36.7 °C) (Oral)    Resp 12    Ht 5' 10\" (1.778 m)    Wt 213 lb 6.4 oz (96.8 kg)    SpO2 95%    BMI 30.62 kg/m2    Body mass index is 30.62 kg/(m^2). Assessment of cognitive impairment: Alert and oriented x 3    Depression Screen:   PHQ 2 / 9, over the last two weeks 2/17/2017   Little interest or pleasure in doing things Not at all   Feeling down, depressed or hopeless Not at all   Total Score PHQ 2 0       Fall Risk Assessment:    Fall Risk Assessment, last 12 mths 2/17/2017   Able to walk? Yes   Fall in past 12 months? No       Functional Ability:   Does the patient exhibit a steady gait? yes   How long did it take the patient to get up and walk from a sitting position? 1 second   Is the patient self reliant?  (ie can do own laundry, meals, household chores)  yes     Does the patient handle his/her own medications? yes     Does the patient handle his/her own money?    yes     Is the patients home safe (ie good lighting, handrails on stairs and bath, etc.)? yes     Did you notice or did patient express any hearing difficulties? Yes; have to speak loudly for patient to hear     Did you notice or did patient express any vision difficulties?   no     Were distance and reading eye charts used? N/a       Advance Care Planning:   Patient was offered the opportunity to discuss advance care planning:  yes     Does patient have an Advance Directive:  no   If no, did you provide information on C[facility] Connections? No, patient verbalized intent to meet with  to complete AMD.        Plan:      Orders Placed This Encounter    METABOLIC PANEL, COMPREHENSIVE    CBC WITH AUTOMATED DIFF       Health Maintenance   Topic Date Due    MEDICARE YEARLY EXAM  02/18/2018    GLAUCOMA SCREENING Q2Y  09/13/2018    DTaP/Tdap/Td series (2 - Td) 10/16/2022    ZOSTER VACCINE AGE 60>  Addressed    Pneumococcal 65+ High/Highest Risk  Completed    INFLUENZA AGE 9 TO ADULT  Addressed       *Patient verbalized understanding and agreement with the plan. A copy of the After Visit Summary with personalized health plan was given to the patient today.

## 2017-02-17 NOTE — MR AVS SNAPSHOT
Visit Information Date & Time Provider Department Dept. Phone Encounter #  
 2/17/2017  1:30 PM Patti Awan MD Internist of [address] [id number] Your Appointments 5/16/2017 10:25 AM  
LAB with [facility] NURSE VISIT Internist of [facility] ([facility] Appt Note: fasting labs [address], [address] [place] [address]  
  
   
 [address], [address]  
  
    
 5/22/2017  2:30 PM  
Office Visit with Patti Awan MD  
Internist of [address] [place] [address]) Appt Note: 3 month ov with labs [address], [address] [place] [facility] [address]  
  
   
 [address] [facility], [address]  
  
    
 5/31/2017  9:00 AM  
PROCEDURE with [facility]  
[facility] ([facility] SCHEDULING) Appt Note: AAA 6 [address]  
 [address] [address]  
[phone] [address] [address]  
  
    
 5/31/2017 10:00 AM  
PROCEDURE with [facility]  
[facility] ([facility] SCHEDULING) Appt Note: CV [address] [address]  
[phone]  
  
    
 6/6/2017 10:15 AM  
Office Visit with Manuelito Myles MD  
[facility] ([facility] [address]) Appt Note: 6 mnth w prep [address] [place] [address] [address]  
[phone]  
  
   
 [address] [place] [address] [address] Upcoming Health Maintenance Date Due  
 MEDICARE YEARLY EXAM 2/18/2018 GLAUCOMA SCREENING Q2Y 9/13/2018 DTaP/Tdap/Td series (2 - Td) 10/16/2022 Allergies as of 2/17/2017  Review Complete On: 2/17/2017 By: Patti Awan MD  
  
 Severity Noted Reaction Type Reactions Aspirin  06/07/2011    Other (comments) Gi side effects Nsaids (Non-steroidal Anti-inflammatory Drug)  06/07/2011   Intolerance Other (comments) Gi upset Current Immunizations  Reviewed on 1/20/2015 Name Date Hepatitis B Vaccine 1/10/2002, 8/3/2001, 6/6/2001 Influenza High Dose Vaccine PF 10/14/2016 Influenza Vaccine 10/14/2015 Influenza Vaccine Whole 10/16/2012, 9/21/2011, 9/16/2010, 9/25/2009, 10/3/2008, 10/10/2007, 11/7/2006, 10/13/2005, 11/10/2004, 10/23/2003, 10/17/2002 Pneumococcal Conjugate (PCV-13) 10/14/2015 Pneumococcal Vaccine (Unspecified Type) 10/17/2002 TD Vaccine 10/13/2005, 5/31/1995 TDAP Vaccine 10/16/2012 Not reviewed this visit You Were Diagnosed With   
  
 Codes Comments Acute diastolic CHF (congestive heart failure) (HCC)    -  Primary ICD-10-CM: I50.31 ICD-9-CM: 428.31, 428.0 Medicare annual wellness visit, subsequent     ICD-10-CM: Z00.00 ICD-9-CM: V70.0 Advance directive discussed with patient     ICD-10-CM: Z71.89 ICD-9-CM: V65.49 Essential hypertension with goal blood pressure less than 140/90     ICD-10-CM: I10 
ICD-9-CM: 401.9 Hyperlipidemia LDL goal <100     ICD-10-CM: E78.5 ICD-9-CM: 272.4 Chronic renal failure, stage 4 (severe) (HCC)     ICD-10-CM: N18.4 ICD-9-CM: 585.4 PAD (peripheral artery disease) (HCC)     ICD-10-CM: I73.9 ICD-9-CM: 443. 9 Vitals BP Pulse Temp Resp Height(growth percentile) Weight(growth percentile)  
 138/62 71 98 °F (36.7 °C) (Oral) 12 5' 10\" (1.778 m) 213 lb 6.4 oz (96.8 kg) SpO2 BMI Smoking Status 95% 30.62 kg/m2 Never Smoker Vitals History BMI and BSA Data Body Mass Index Body Surface Area  
 30.62 kg/m 2 2.19 m 2 Preferred Pharmacy Pharmacy Name Phone [address] [place] [address], [address] [place] [address] [phone] Your Updated Medication List  
  
   
This list is accurate as of: 2/17/17  2:07 PM.  Always use your most recent med list.  
  
  
  
  
 chlorthalidone 25 mg tablet Commonly known as:  Jonna Weir Take 0.5 Tabs by mouth daily. dilTIAZem  mg ER capsule Commonly known as:  CARDIZEM CD Take 1 Cap by mouth daily. fluticasone 50 mcg/actuation nasal spray Commonly known as:  FLONASE  
2 sprays in each nostril daily  
  
 furosemide 40 mg tablet Commonly known as:  LASIX  
1 tablet by mouth each morning Iron 325 mg (65 mg iron) tablet Generic drug:  ferrous sulfate Take  by mouth Daily (before breakfast). lisinopril 5 mg tablet Commonly known as:  Mellissa Primmer Take  by mouth daily. pantoprazole 40 mg tablet Commonly known as:  PROTONIX Take 1 Tab by mouth daily. traMADol 50 mg tablet Commonly known as:  ULTRAM  
Take 1 Tab by mouth every six (6) hours as needed for Pain. Max Daily Amount: 200 mg. VITAMIN B-12 500 mcg tablet Generic drug:  cyanocobalamin Take 1,000 mcg by mouth daily. To-Do List   
 Around 05/10/2017 Lab:  CBC WITH AUTOMATED DIFF Around 05/10/2017 Lab:  METABOLIC PANEL, COMPREHENSIVE Patient Instructions Medicare Part B Preventive Services Limitations Recommendation Scheduled Bone Mass Measurement 
(age 72 & older, biennial) Requires diagnosis related to osteoporosis or estrogen deficiency. Biennial benefit unless patient has history of long-term glucocorticoid tx or baseline is needed because initial test was by other method Every 2 years or more frequently if medically necessary. N/A Cardiovascular Screening Blood Tests (every 5 years) Total cholesterol, HDL, Triglycerides Order as a panel if possible Every 5 years. Done: 
2/10/2017 Colorectal Cancer Screening 
-Fecal occult blood test (annual) -Flexible sigmoidoscopy (5y) 
-Screening colonoscopy (10y) -Barium Enema Colorectal Cancer Screening (Ages 54-65 yrs old)  For all patients 48 and older: 
-Annual fecal occult blood test or 
colonoscopy every 10 years or 
-Flexible sigmoidoscopy every 5 years or -lower endoscopy to be performed more frequently, if advised by GI. N/A Counseling to Prevent Tobacco Use (up to 8 sessions per year) - Counseling greater than 3 and up to 10 minutes - Counseling greater than 10 minutes Patients must be asymptomatic of tobacco-related conditions to receive as preventive service Two cessation counseling attempts (up to 8 counseling sessions) per year. N/A Diabetes Screening Tests (at least every 3 years, Medicare covers annually or at 6-month intervals for prediabetic patients) Fasting blood sugar (FBS) or glucose tolerance test (GTT) Patient must be diagnosed with one of the following: 
-Hypertension, Dyslipidemia, obesity, previous impaired FBS or GTT 
Or any two of the following: overweight, FH of diabetes, age ? 72, history of gestational diabetes, birth of baby weighing more than 9 pounds Annually or every 6 months if previous diagnosis of elevated FBS, elevated HbA1c, or impaired GTT, or glucosuria. Done: 
2/10/2017 Diabetes Self-Management Training (DSMT) (no USPSTF recommendation) Requires referral by treating physician for patient with diabetes or renal disease. 10 hours of initial DSMT session of no less than 30 minutes each in a continuous 12-month period. 2 hours of follow-up DSMT in subsequent years. Up to 10 hours of initial training within a continuous 12 month period of subsequent years: up to 2 hours of follow-up training each year after the initial year. Patient declined at this time. Glaucoma Screening (no USPSTF recommendation) Diabetes mellitus, family history, , age 48 or over,  American, age 72 or over Annually for covered beneficiaries. Done: 
9/13/2016 Human Immunodeficiency Virus (HIV) Screening (annually for increased risk patients) HIV-1 and HIV-2 by EIA, RAMÍREZ, rapid antibody test, or oral mucosa transudate Patient must be at increased risk for HIV infection per USPSTF guidelines or pregnant. Tests covered annually for patients at increased risk. Pregnant patients may receive up to 3 test during pregnancy. Annually for beneficiaries at increased risk, including anyone who asks for the test. Not at risk Medical Nutrition Therapy (MNT) (for diabetes or renal disease not recommended schedule) Requires referral by treating physician for patient with diabetes or renal disease. Can be provided in same year as diabetes self-management training (DSMT), and CMS recommends medical nutrition therapy take place after DSMT. Up to 3 hours for initial year and 2 hours in subsequent years. First year: 3 hours of one-on-one counseling or subsequent years: 2 hours. Patient declined at this time. Prostate Cancer Screening (annually up to age 76) - Digital rectal exam (JESSICA) - Prostate specific antigen (PSA) Annually (age 48 or over), JESSICA not paid separately when covered E/M service is provided on same date Once every 12 months for patients age older than 48years of age includes: digital rectal exam and/or prostate specific antigen test. N/A Seasonal Influenza Vaccination (annually)  Once per fall or winter season. Done: 
10/14/2016 Pneumococcal Vaccination (once after 72)  Once after age 72 and if more than 5 years since last vaccination and/or uncertainty of vaccine status. Pneumococcal: 
10/17/2002 Prevnar 13: 
10/14/2015 Hepatitis B Vaccinations (if medium/high risk) Medium/high risk factors:  End-stage renal disease, Hemophiliacs who received Factor VIII or IX concentrates, Clients of institutions for the mentally retarded, Persons who live in the same house as a HepB virus carrier, Homosexual men, Illicit injectable drug abusers. Schedule course of vaccines if patient not previously vaccinated *additional shots if medically necessary. Not at risk Screening Mammography (biennial age 54-69)?  Annually (age 36 or over) Age 28 through 44: one baseline or aged 36 and older: annually. N/A Screening Pap Tests and Pelvic Examination (up to age 79 and after 79 if unknown history or abnormal study last 10 years) Every 24 months except high risk Annually if at elpidio risk for developing cervical or vaginal cancer, or childbearing, age with abnormal Pap test within past 3 years or every 2 years for women at normal risk. N/A Ultrasound Screening for Abdominal Aortic Aneurysm (AAA) (once) Patient must be referred through IPPE and not have had a screening for abdominal aortic aneurysm before under Medicare. Limited to patients who meet one of the following criteria: 
- Men who are 73-68 years old and have smoked more than 100 cigarettes in their lifetime. 
-Anyone with a FH of AAA 
-Anyone recommended for screening by USPSTF Once in a lifetime. N/A Schedule of Personalized Health Plan (Provide Copy to Patient) The best way to stay healthy is to live a healthy lifestyle. A healthy lifestyle includes regular exercise, eating a well-balanced diet, keeping a healthy weight and not smoking. Regular physical exams and screening tests are another important way to take care of yourself. Preventive exams provided by health care providers can find health problems early when treatment works best and can keep you from getting certain diseases or illnesses. Preventive services include exams, lab tests, screenings, shots, monitoring and information to help you take care of your own health. All people over 65 should have a pneumonia shot. Pneumonia shots are usually only needed once in a lifetime unless your doctor decides differently. All people over 65 should have a yearly flu shot. People over 65 are at medium to high risk for Hepatitis B. Three shots are needed for complete protection. In addition to your physical exam, some screening tests are recommended: Bone mass measurement (dexa scan) is recommended every two years if you have certain risk factors, such as personal history of vertebral fracture or chronic steroid medication use Diabetes Mellitus screening is recommended every year. Glaucoma is an eye disease caused by high pressure in the eye. An eye exam is recommended every year. Cardiovascular screening tests that check your cholesterol and other blood fat (lipid) levels are recommended every five years. Colorectal Cancer screening tests help to find pre-cancerous polyps (growths in the colon) so they can be removed before they turn into cancer. Tests ordered for screening depend on your personal and family history risk factors. Screening for Prostate Cancer is recommended yearly with a digital rectal exam and/or a PSA test 
 
Here is a list of your current Health Maintenance items with a due date: 
Health Maintenance Topic Date Due  MEDICARE YEARLY EXAM  02/18/2018  GLAUCOMA SCREENING Q2Y  09/13/2018  DTaP/Tdap/Td series (2 - Td) 10/16/2022  ZOSTER VACCINE AGE 60>  Addressed  Pneumococcal 65+ High/Highest Risk  Completed  INFLUENZA AGE 9 TO ADULT  Addressed Diabetic Renal Diet: Care Instructions Your Care Instructions You may already be spreading carbohydrate throughout your daily meals. When you also have kidney disease, you need to avoid foods that make your kidneys worse. Keep your blood sugar and blood pressure as near normal as you can to reduce your chance of kidney failure. Your doctor and dietitian will help you make an eating plan. It will be based on your body weight, size, and medical condition. You may need to limit salt, fluids, and protein. You also may need to limit minerals such as potassium and phosphorus. It takes planning, but there are plenty of tasty, healthy foods you can eat. Always talk with your doctor or dietitian before you make changes in your diet. Follow-up care is a key part of your treatment and safety. Be sure to make and go to all appointments, and call your doctor if you are having problems. It's also a good idea to know your test results and keep a list of the medicines you take. How can you care for yourself at home? · Work with your doctor or dietitian to create a food plan that guides your daily food choices. · Eat regular meals. Do not skip meals or go for many hours without eating. To help control your blood sugar, try to eat several small meals during the day, rather than three large ones. · You can use margarine, mayonnaise, and oil to add calories to your diet for energy. The healthiest oils are olive, canola, and safflower oils. · Talk to your dietitian about eating sweets, including honey and sugar. · Be safe with medicines. Take your medicines exactly as prescribed. Call your doctor if you think you are having a problem with your medicine. You will get more details on the specific medicines your doctor prescribes. · Do not take any other medicine without talking to your doctor first. This includes over-the-counter medicines, vitamins, and herbal products. · Limit alcohol to no more than 1 drink per day. Count it as part of your fluid allowance. To get the right amount of protein · Ask your doctor or dietitian how much protein you can have each day. You need some protein to stay healthy. · Include all sources of protein in your daily protein count. Besides meat, poultry, and fish, protein is found in milk and milk products, breads, cereals, and most vegetables. To limit salt · Do not add salt to your food. Avoid foods that list salt, sodium, or MSG as an ingredient. And look for \"reduced salt\" or \"low sodium\" on labels. · Do not use a salt substitute or lite salt unless your doctor says it is okay. (These products are high in potassium.) · Avoid or use very small amounts of condiments and marinades.  These include soy sauce, fish sauce, and barbecue sauce. They are high in sodium. · Avoid salted pretzels, chips, and other salted snacks. · Check food labels to become more aware of the sodium content of foods. Foods that are high in sodium include soups; many canned foods; cured, smoked, or dried meats; and many packaged foods. To control carbohydrate · Spread carbohydrate throughout the day. This helps to prevent high blood sugar after meals. Ask your dietitian how much carbohydrate you can have. Carbohydrate foods include: ¨ Whole-grain and refined breads and cereals, and some vegetables such as peas and beans. ¨ Fruits, milk, and milk products (except cheese). ¨ Candy, table sugar, and regular carbonated drinks. To limit fluids · Know what your fluid allowance is. Fill a pitcher with that amount of water every day. If you drink another fluid (such as coffee) that day, pour an equal amount out of the pitcher. · Foods that are liquid at room temperature count as fluids. These include ice cream and gelatin desserts such as Jell-O. To limit potassium · Fruits that are low in potassium include blueberries and raspberries. · Vegetables that are low in potassium include cucumber and radishes. To limit phosphorus · Follow your doctor's or dietitian's plan for your limit on milk and milk products in your diet. · Avoid nuts, peanut butter, seeds, lentils, beans, organ meats, and sardines. · Avoid cola drinks. · Avoid bran breads or bran cereals. They are high in phosphorus. Where can you learn more? Go to http://annabella-abran.info/. Enter K944 in the search box to learn more about \"Diabetic Renal Diet: Care Instructions. \" Current as of: May 23, 2016 Content Version: 11.1 © 5010-0447 [employer]. Care instructions adapted under license by [employer] (which disclaims liability or warranty for this information).  If you have questions about a medical condition or this instruction, always ask your healthcare professional. [address] any warranty or liability for your use of this information. Advance Directives: Care Instructions Your Care Instructions An advance directive is a legal way to state your wishes at the end of your life. It tells your family and your doctor what to do if you can no longer say what you want. There are two main types of advance directives. You can change them any time that your wishes change. · A living will tells your family and your doctor your wishes about life support and other treatment. · A medical power of  lets you name a person to make treatment decisions for you when you can't speak for yourself. This person is called a health care agent. If you do not have an advance directive, decisions about your medical care may be made by a doctor or a  who doesn't know you. It may help to think of an advance directive as a gift to the people who care for you. If you have one, they won't have to make tough decisions by themselves. Follow-up care is a key part of your treatment and safety. Be sure to make and go to all appointments, and call your doctor if you are having problems. It's also a good idea to know your test results and keep a list of the medicines you take. How can you care for yourself at home? · Discuss your wishes with your loved ones and your doctor. This way, there are no surprises. · Many states have a unique form. Or you might use a universal form that has been approved by many states. This kind of form can sometimes be completed and stored online. Your electronic copy will then be available wherever you have a connection to the Internet. In most cases, doctors will respect your wishes even if you have a form from a different state. · You don't need a  to do an advance directive. But you may want to get legal advice. · Think about these questions when you prepare an advance directive: ¨ Who do you want to make decisions about your medical care if you are not able to? Many people choose a family member, close friend, or doctor. ¨ Do you know enough about life support methods that might be used? If not, talk to your doctor so you understand. ¨ What are you most afraid of that might happen? You might be afraid of having pain, losing your independence, or being kept alive by machines. ¨ Where would you prefer to die? Choices include your home, a hospital, or a nursing home. ¨ Would you like to have information about hospice care to support you and your family? ¨ Do you want to donate organs when you die? ¨ Do you want certain Shinto practices performed before you die? If so, put your wishes in the advance directive. · Read your advance directive every year, and make changes as needed. When should you call for help? Be sure to contact your doctor if you have any questions. Where can you learn more? Go to http://annabella-abran.info/. Enter R264 in the search box to learn more about \"Advance Directives: Care Instructions. \" Current as of: February 24, 2016 Content Version: 11.1 © 8866-7685 [employer]. Care instructions adapted under license by [employer] (which disclaims liability or warranty for this information). If you have questions about a medical condition or this instruction, always ask your healthcare professional. [place] [address] any warranty or liability for your use of this information. Please provide this summary of care documentation to your next provider. Your primary care clinician is listed as Karol Elam. Jane Meyer. If you have any questions after today's visit, please call [phone].

## 2017-02-17 NOTE — PROGRESS NOTES
Linnea Lazaro 1935, is a [de-identified] y.o. male, who is seen today for reevaluation of recently diagnosed heart failure, anemia of chronic disease, chronic renal failure, hypertension and GERD. He is breathing much better than he was when I saw him 10 days ago. At that time he was fairly dyspneic with even minimal exertion and laboratory at that time showed proBNP of greater than 35,000. He was started on furosemide 40 mg daily that day and since then is breathing much better and his weight is down 12 pounds from 10 days ago. He otherwise feels fairly well. He takes all of his medicine correctly. He says his wife broke her hip recently and is going to be getting out of rehab in about a week. He hopes he can handle her at home. He is having no chest pain. No PND or orthopnea. He has some edema of the lower extremities and that is somewhat tender sometimes. Past Medical History   Diagnosis Date    Abdominal aortic aneurysm ([facility] [address] 4/17/2014     3.4 cm,  5/12    Allergic rhinitis     Anemia NEC      mildly    Chronic LBP     Gross hematuria      rarely    Headache(784.0)     HTN (hypertension) 6/7/2011    Hypercholesterolemia     Hyperlipidemia 6/7/2011    Obesity     Polymyalgia ([facility] [address] 6/7/2011     Past Surgical History   Procedure Laterality Date    Hx hemorrhoidectomy      Endoscopy, colon, diagnostic       Current Outpatient Prescriptions   Medication Sig Dispense Refill    fluticasone (FLONASE) 50 mcg/actuation nasal spray 2 sprays in each nostril daily 1 Bottle 2    furosemide (LASIX) 40 mg tablet 1 tablet by mouth each morning 90 Tab 0    traMADol (ULTRAM) 50 mg tablet Take 1 Tab by mouth every six (6) hours as needed for Pain. Max Daily Amount: 200 mg. 40 Tab 3    chlorthalidone (HYGROTEN) 25 mg tablet Take 0.5 Tabs by mouth daily. 45 Tab 3    diltiazem CD (CARDIZEM CD) 240 mg ER capsule Take 1 Cap by mouth daily.  90 Cap 3    pantoprazole (PROTONIX) 40 mg tablet Take 1 Tab by mouth daily. 30 Tab 11    lisinopril (PRINIVIL, ZESTRIL) 5 mg tablet Take  by mouth daily.  cyanocobalamin (VITAMIN B-12) 500 mcg tablet Take 1,000 mcg by mouth daily.  ferrous sulfate (IRON) 325 mg (65 mg iron) tablet Take  by mouth Daily (before breakfast). Allergies   Allergen Reactions    Aspirin Other (comments)     Gi side effects    Nsaids (Non-Steroidal Anti-Inflammatory Drug) Other (comments)     Gi upset     Social History     Social History    Marital status:      Spouse name: N/A    Number of children: N/A    Years of education: N/A     Social History Main Topics    Smoking status: Never Smoker    Smokeless tobacco: Never Used    Alcohol use No    Drug use: No    Sexual activity: Not Asked     Other Topics Concern    None     Social History Narrative     Visit Vitals    /62    Pulse 71    Temp 98 °F (36.7 °C) (Oral)    Resp 12    Ht 5' 10\" (1.778 m)    Wt 213 lb 6.4 oz (96.8 kg)    SpO2 95%    BMI 30.62 kg/m2     Carotids are 2+ without bruits. No JVD or HJR. Lungs are clear to percussion. Good breath sounds with no wheezing or crackles. Heart reveals a regular rhythm with normal S1 and S2 no murmur gallop click or rub. Apical impulse is not palpable. Abdomen is soft and nontender with no hepatosplenomegaly or masses and no bruits. Extremities reveal no clubbing or cyanosis but he does have 1+ lower leg edema bilaterally. No redness and only mild tenderness. Pulses are 2+ except absent dorsalis pedis pulses.     Results for orders placed or performed during the hospital encounter of 02/10/17   RENAL FUNCTION PANEL   Result Value Ref Range    Sodium 139 136 - 145 mmol/L    Potassium 4.7 3.5 - 5.5 mmol/L    Chloride 107 100 - 108 mmol/L    CO2 22 21 - 32 mmol/L    Anion gap 10 3.0 - 18 mmol/L    Glucose 94 74 - 99 mg/dL    BUN 47 (H) 7.0 - 18 MG/DL    Creatinine 2.75 (H) 0.6 - 1.3 MG/DL    BUN/Creatinine ratio 17 12 - 20      GFR est AA 27 (L) >60 ml/min/1.73m2    GFR est non-AA 22 (L) >60 ml/min/1.73m2    Calcium 8.3 (L) 8.5 - 10.1 MG/DL    Phosphorus 4.6 2.5 - 4.9 MG/DL    Albumin 3.7 3.4 - 5.0 g/dL   PTH INTACT   Result Value Ref Range    Calcium 8.7 8.5 - 10.1 MG/DL    PTH, Intact 131.2 (H) 14.0 - 72.0 pg/mL   FERRITIN   Result Value Ref Range    Ferritin 111 8 - 388 NG/ML   HGB & HCT   Result Value Ref Range    HGB 10.4 (L) 13.0 - 16.0 g/dL    HCT 33.6 (L) 36.0 - 48.0 %   IRON PROFILE   Result Value Ref Range    Iron 40 (L) 50 - 175 ug/dL    TIBC 279 250 - 450 ug/dL    Iron % saturation 14 %   MICROALBUMIN, UR, RAND W/ MICROALBUMIN/CREA RATIO   Result Value Ref Range    Microalbumin,urine random 12.10 (H) 0 - 3.0 MG/DL    Creatinine, urine 66.56 30 - 125 mg/dL    Microalbumin/Creat ratio (mg/g creat) 182 (H) 0 - 30 mg/g   CREATININE, UR, RANDOM   Result Value Ref Range    Creatinine, urine 69.20 30 - 125 mg/dL   LIPID PANEL   Result Value Ref Range    LIPID PROFILE          Cholesterol, total 161 <200 MG/DL    Triglyceride 62 <150 MG/DL    HDL Cholesterol 44 40 - 60 MG/DL    LDL, calculated 104.6 (H) 0 - 100 MG/DL    VLDL, calculated 12.4 MG/DL    CHOL/HDL Ratio 3.7 0 - 5.0       Assessment: #1. Recent acute diastolic heart failure, doing much better. He will continue chlorthalidone 12.5 mg daily and furosemide 40 mg daily and he will call me if shortness of breath develops again. Echocardiogram showed an ejection fraction of 55-60% in 2011 and has not been repeated since then. #2.  Lower extremity edema, multifactorial.  Hopefully that will go down with these diuretics and I have recommended he elevate his legs when he is sitting. #3.  Stage IV renal failure unchanged from several months ago. That will be monitored. #4.  Anemia of chronic disease, iron level is normal.  That will be monitored in another 3 months. #5.  GERD asymptomatic. He will continue pantoprazole 40 mg daily. Follow-up in 3 months with lab.   He had a Medicare wellness evaluation today and I agree with Debbie's note. Terry Alfaro MD FACP    Please note: This document has been produced using voice recognition software. Unrecognized errors in transcription may be present.

## 2017-02-17 NOTE — PATIENT INSTRUCTIONS
Medicare Part B Preventive Services Limitations Recommendation Scheduled   Bone Mass Measurement  (age 72 & older, biennial) Requires diagnosis related to osteoporosis or estrogen deficiency. Biennial benefit unless patient has history of long-term glucocorticoid tx or baseline is needed because initial test was by other method Every 2 years or more frequently if medically necessary. N/A         Cardiovascular Screening Blood Tests (every 5 years)  Total cholesterol, HDL, Triglycerides Order as a panel if possible Every 5 years. Done:  2/10/2017         Colorectal Cancer Screening  -Fecal occult blood test (annual)  -Flexible sigmoidoscopy (5y)  -Screening colonoscopy (10y)  -Barium Enema Colorectal Cancer Screening (Ages 54-65 yrs old)  For all patients 48 and older:  -Annual fecal occult blood test or  colonoscopy every 10 years or  -Flexible sigmoidoscopy every 5 years or  -lower endoscopy to be performed more frequently, if advised by GI. N/A         Counseling to Prevent Tobacco Use (up to 8 sessions per year)  - Counseling greater than 3 and up to 10 minutes  - Counseling greater than 10 minutes Patients must be asymptomatic of tobacco-related conditions to receive as preventive service Two cessation counseling attempts (up to 8 counseling sessions) per year. N/A   Diabetes Screening Tests (at least every 3 years, Medicare covers annually or at 6-month intervals for prediabetic patients)    Fasting blood sugar (FBS) or glucose tolerance test (GTT) Patient must be diagnosed with one of the following:  -Hypertension, Dyslipidemia, obesity, previous impaired FBS or GTT  Or any two of the following: overweight, FH of diabetes, age ? 72, history of gestational diabetes, birth of baby weighing more than 9 pounds Annually or every 6 months if previous diagnosis of elevated FBS, elevated HbA1c, or impaired GTT, or glucosuria.  Done:  2/10/2017         Diabetes Self-Management Training (DSMT) (no USPSTF recommendation) Requires referral by treating physician for patient with diabetes or renal disease. 10 hours of initial DSMT session of no less than 30 minutes each in a continuous 12-month period. 2 hours of follow-up DSMT in subsequent years. Up to 10 hours of initial training within a continuous 12 month period of subsequent years: up to 2 hours of follow-up training each year after the initial year. Patient declined at this time. Glaucoma Screening (no USPSTF recommendation) Diabetes mellitus, family history, , age 48 or over,  American, age 72 or over Annually for covered beneficiaries. Done:  9/13/2016         Human Immunodeficiency Virus (HIV) Screening (annually for increased risk patients)  HIV-1 and HIV-2 by EIA, RAMÍREZ, rapid antibody test, or oral mucosa transudate Patient must be at increased risk for HIV infection per USPSTF guidelines or pregnant. Tests covered annually for patients at increased risk. Pregnant patients may receive up to 3 test during pregnancy. Annually for beneficiaries at increased risk, including anyone who asks for the test. Not at risk   Medical Nutrition Therapy (MNT) (for diabetes or renal disease not recommended schedule) Requires referral by treating physician for patient with diabetes or renal disease. Can be provided in same year as diabetes self-management training (DSMT), and CMS recommends medical nutrition therapy take place after DSMT. Up to 3 hours for initial year and 2 hours in subsequent years. First year: 3 hours of one-on-one counseling or subsequent years: 2 hours. Patient declined at this time.     Prostate Cancer Screening (annually up to age 76)  - Digital rectal exam (JESSICA)  - Prostate specific antigen (PSA) Annually (age 48 or over), JESSICA not paid separately when covered E/M service is provided on same date Once every 12 months for patients age older than 48years of age includes: digital rectal exam and/or prostate specific antigen test. N/A         Seasonal Influenza Vaccination (annually)  Once per fall or winter season. Done:  10/14/2016              Pneumococcal Vaccination (once after 72)  Once after age 72 and if more than 5 years since last vaccination and/or uncertainty of vaccine status. Pneumococcal:  10/17/2002    Prevnar 13:  10/14/2015   Hepatitis B Vaccinations (if medium/high risk) Medium/high risk factors:  End-stage renal disease,  Hemophiliacs who received Factor VIII or IX concentrates, Clients of institutions for the mentally retarded, Persons who live in the same house as a HepB virus carrier, Homosexual men, Illicit injectable drug abusers. Schedule course of vaccines if patient not previously vaccinated  *additional shots if medically necessary. Not at risk   Screening Mammography (biennial age 54-69)? Annually (age 36 or over) Age 28 through 44: one baseline or aged 36 and older: annually. N/A         Screening Pap Tests and Pelvic Examination (up to age 79 and after 79 if unknown history or abnormal study last 10 years) Every 24 months except high risk Annually if at elpidio risk for developing cervical or vaginal cancer, or childbearing, age with abnormal Pap test within past 3 years or every 2 years for women at normal risk. N/A           Ultrasound Screening for Abdominal Aortic Aneurysm (AAA) (once) Patient must be referred through IPPE and not have had a screening for abdominal aortic aneurysm before under Medicare. Limited to patients who meet one of the following criteria:  - Men who are 73-68 years old and have smoked more than 100 cigarettes in their lifetime.  -Anyone with a FH of AAA  -Anyone recommended for screening by USPSTF Once in a lifetime. N/A          Schedule of Personalized Health Plan  (Provide Copy to Patient)  The best way to stay healthy is to live a healthy lifestyle. A healthy lifestyle includes regular exercise, eating a well-balanced diet, keeping a healthy weight and not smoking.     Regular physical exams and screening tests are another important way to take care of yourself. Preventive exams provided by health care providers can find health problems early when treatment works best and can keep you from getting certain diseases or illnesses. Preventive services include exams, lab tests, screenings, shots, monitoring and information to help you take care of your own health. All people over 65 should have a pneumonia shot. Pneumonia shots are usually only needed once in a lifetime unless your doctor decides differently. All people over 65 should have a yearly flu shot. People over 65 are at medium to high risk for Hepatitis B. Three shots are needed for complete protection. In addition to your physical exam, some screening tests are recommended:    Bone mass measurement (dexa scan) is recommended every two years if you have certain risk factors, such as personal history of vertebral fracture or chronic steroid medication use    Diabetes Mellitus screening is recommended every year. Glaucoma is an eye disease caused by high pressure in the eye. An eye exam is recommended every year. Cardiovascular screening tests that check your cholesterol and other blood fat (lipid) levels are recommended every five years. Colorectal Cancer screening tests help to find pre-cancerous polyps (growths in the colon) so they can be removed before they turn into cancer. Tests ordered for screening depend on your personal and family history risk factors.     Screening for Prostate Cancer is recommended yearly with a digital rectal exam and/or a PSA test    Here is a list of your current Health Maintenance items with a due date:  Health Maintenance   Topic Date Due    MEDICARE YEARLY EXAM  02/18/2018    GLAUCOMA SCREENING Q2Y  09/13/2018    DTaP/Tdap/Td series (2 - Td) 10/16/2022    ZOSTER VACCINE AGE 60>  Addressed    Pneumococcal 65+ High/Highest Risk  Completed    INFLUENZA AGE 9 TO ADULT Addressed            Diabetic Renal Diet: Care Instructions  Your Care Instructions  You may already be spreading carbohydrate throughout your daily meals. When you also have kidney disease, you need to avoid foods that make your kidneys worse. Keep your blood sugar and blood pressure as near normal as you can to reduce your chance of kidney failure. Your doctor and dietitian will help you make an eating plan. It will be based on your body weight, size, and medical condition. You may need to limit salt, fluids, and protein. You also may need to limit minerals such as potassium and phosphorus. It takes planning, but there are plenty of tasty, healthy foods you can eat. Always talk with your doctor or dietitian before you make changes in your diet. Follow-up care is a key part of your treatment and safety. Be sure to make and go to all appointments, and call your doctor if you are having problems. It's also a good idea to know your test results and keep a list of the medicines you take. How can you care for yourself at home? · Work with your doctor or dietitian to create a food plan that guides your daily food choices. · Eat regular meals. Do not skip meals or go for many hours without eating. To help control your blood sugar, try to eat several small meals during the day, rather than three large ones. · You can use margarine, mayonnaise, and oil to add calories to your diet for energy. The healthiest oils are olive, canola, and safflower oils. · Talk to your dietitian about eating sweets, including honey and sugar. · Be safe with medicines. Take your medicines exactly as prescribed. Call your doctor if you think you are having a problem with your medicine. You will get more details on the specific medicines your doctor prescribes. · Do not take any other medicine without talking to your doctor first. This includes over-the-counter medicines, vitamins, and herbal products.   · Limit alcohol to no more than 1 drink per day. Count it as part of your fluid allowance. To get the right amount of protein  · Ask your doctor or dietitian how much protein you can have each day. You need some protein to stay healthy. · Include all sources of protein in your daily protein count. Besides meat, poultry, and fish, protein is found in milk and milk products, breads, cereals, and most vegetables. To limit salt  · Do not add salt to your food. Avoid foods that list salt, sodium, or MSG as an ingredient. And look for \"reduced salt\" or \"low sodium\" on labels. · Do not use a salt substitute or lite salt unless your doctor says it is okay. (These products are high in potassium.)  · Avoid or use very small amounts of condiments and marinades. These include soy sauce, fish sauce, and barbecue sauce. They are high in sodium. · Avoid salted pretzels, chips, and other salted snacks. · Check food labels to become more aware of the sodium content of foods. Foods that are high in sodium include soups; many canned foods; cured, smoked, or dried meats; and many packaged foods. To control carbohydrate  · Spread carbohydrate throughout the day. This helps to prevent high blood sugar after meals. Ask your dietitian how much carbohydrate you can have. Carbohydrate foods include:  ¨ Whole-grain and refined breads and cereals, and some vegetables such as peas and beans. ¨ Fruits, milk, and milk products (except cheese). ¨ Candy, table sugar, and regular carbonated drinks. To limit fluids  · Know what your fluid allowance is. Fill a pitcher with that amount of water every day. If you drink another fluid (such as coffee) that day, pour an equal amount out of the pitcher. · Foods that are liquid at room temperature count as fluids. These include ice cream and gelatin desserts such as Jell-O. To limit potassium  · Fruits that are low in potassium include blueberries and raspberries.   · Vegetables that are low in potassium include cucumber and radishes. To limit phosphorus  · Follow your doctor's or dietitian's plan for your limit on milk and milk products in your diet. · Avoid nuts, peanut butter, seeds, lentils, beans, organ meats, and sardines. · Avoid cola drinks. · Avoid bran breads or bran cereals. They are high in phosphorus. Where can you learn more? Go to http://annabella-abran.info/. Enter F525 in the search box to learn more about \"Diabetic Renal Diet: Care Instructions. \"  Current as of: May 23, 2016  Content Version: 11.1  © 2318-6461 [employer]. Care instructions adapted under license by [employer] (which disclaims liability or warranty for this information). If you have questions about a medical condition or this instruction, always ask your healthcare professional. [place] [address] any warranty or liability for your use of this information. Advance Directives: Care Instructions  Your Care Instructions  An advance directive is a legal way to state your wishes at the end of your life. It tells your family and your doctor what to do if you can no longer say what you want. There are two main types of advance directives. You can change them any time that your wishes change. · A living will tells your family and your doctor your wishes about life support and other treatment. · A medical power of  lets you name a person to make treatment decisions for you when you can't speak for yourself. This person is called a health care agent. If you do not have an advance directive, decisions about your medical care may be made by a doctor or a  who doesn't know you. It may help to think of an advance directive as a gift to the people who care for you. If you have one, they won't have to make tough decisions by themselves. Follow-up care is a key part of your treatment and safety.  Be sure to make and go to all appointments, and call your doctor if you are having problems. It's also a good idea to know your test results and keep a list of the medicines you take. How can you care for yourself at home? · Discuss your wishes with your loved ones and your doctor. This way, there are no surprises. · Many states have a unique form. Or you might use a universal form that has been approved by many states. This kind of form can sometimes be completed and stored online. Your electronic copy will then be available wherever you have a connection to the Internet. In most cases, doctors will respect your wishes even if you have a form from a different state. · You don't need a  to do an advance directive. But you may want to get legal advice. · Think about these questions when you prepare an advance directive:  ¨ Who do you want to make decisions about your medical care if you are not able to? Many people choose a family member, close friend, or doctor. ¨ Do you know enough about life support methods that might be used? If not, talk to your doctor so you understand. ¨ What are you most afraid of that might happen? You might be afraid of having pain, losing your independence, or being kept alive by machines. ¨ Where would you prefer to die? Choices include your home, a hospital, or a nursing home. ¨ Would you like to have information about hospice care to support you and your family? ¨ Do you want to donate organs when you die? ¨ Do you want certain Holiness practices performed before you die? If so, put your wishes in the advance directive. · Read your advance directive every year, and make changes as needed. When should you call for help? Be sure to contact your doctor if you have any questions. Where can you learn more? Go to http://annabella-abran.info/. Enter R264 in the search box to learn more about \"Advance Directives: Care Instructions. \"  Current as of: February 24, 2016  Content Version: 11.1  © 3412-5374 [employer].  Care instructions adapted under license by [employer] (which disclaims liability or warranty for this information). If you have questions about a medical condition or this instruction, always ask your healthcare professional. [address] any warranty or liability for your use of this information.

## 2017-04-16 NOTE — ED PROVIDER NOTES
HPI Comments: 8:26 PM Christian Lima is a [de-identified] y.o. male with h/o HTN who presents to ED complaining of abdominal pain onset 4:30 this morning. The pain has been constant all day. He also complains of vomiting. He rates his pain at a 3-4 currently, but states this is improved. The patient denies diarrhea, fever, or chills. No other concerns or symptoms at this time. PCP: Elizabeth John MD      The history is provided by the patient. Past Medical History:   Diagnosis Date    Abdominal aortic aneurysm ([facility] [address] 4/17/2014    3.4 cm,  5/12    Allergic rhinitis     Anemia NEC     mildly    Arthritis     Chronic LBP     Gross hematuria     rarely    Headache     HTN (hypertension) 6/7/2011    Hypercholesterolemia     Hyperlipidemia 6/7/2011    Obesity     Polymyalgia ([facility] [address] 6/7/2011       Past Surgical History:   Procedure Laterality Date    ENDOSCOPY, COLON, DIAGNOSTIC      HX HEMORRHOIDECTOMY           Family History:   Problem Relation Age of Onset    Cancer Other      aunt, breast;       Social History     Social History    Marital status:      Spouse name: N/A    Number of children: N/A    Years of education: N/A     Occupational History    Not on file. Social History Main Topics    Smoking status: Never Smoker    Smokeless tobacco: Never Used    Alcohol use No    Drug use: No    Sexual activity: Not on file     Other Topics Concern    Not on file     Social History Narrative         ALLERGIES: Aspirin and Nsaids (non-steroidal anti-inflammatory drug)    Review of Systems   Constitutional: Negative for chills, diaphoresis and fever. HENT: Negative for congestion. Respiratory: Negative for cough and shortness of breath. Cardiovascular: Negative for chest pain. Gastrointestinal: Positive for abdominal pain and vomiting. Negative for diarrhea and nausea. Musculoskeletal: Negative for back pain. Skin: Negative for rash. Neurological: Negative for dizziness. All other systems reviewed and are negative. Vitals:    04/15/17 2018   BP: 140/65   Pulse: 68   Resp: 18   Temp: 97.5 °F (36.4 °C)   SpO2: 98%   Weight: 93 kg (205 lb)   Height: 5' 10\" (1.778 m)            Physical Exam   Constitutional: He is oriented to person, place, and time. He appears well-developed and well-nourished. No distress. HENT:   Head: Normocephalic and atraumatic. Mouth/Throat: Oropharynx is clear and moist.   Eyes: Conjunctivae and EOM are normal. Pupils are equal, round, and reactive to light. Neck: Normal range of motion. Neck supple. Cardiovascular: Normal rate, regular rhythm, normal heart sounds and intact distal pulses. No murmur heard. Pulmonary/Chest: Effort normal and breath sounds normal. No respiratory distress. He has no wheezes. He has no rales. He exhibits no tenderness. Abdominal: Soft. Bowel sounds are normal. He exhibits no distension. There is no tenderness. There is no rebound. Musculoskeletal: Normal range of motion. He exhibits no edema or tenderness. Neurological: He is alert and oriented to person, place, and time. No cranial nerve deficit. He exhibits normal muscle tone. Coordination normal.   Skin: Skin is warm and dry. No rash noted. No pallor. Psychiatric: He has a normal mood and affect. His behavior is normal. Judgment and thought content normal.   Nursing note and vitals reviewed.        MDM  Number of Diagnoses or Management Options  Enteritis:      Amount and/or Complexity of Data Reviewed  Clinical lab tests: ordered and reviewed  Tests in the radiology section of CPT®: ordered and reviewed    Risk of Complications, Morbidity, and/or Mortality  Presenting problems: moderate  Diagnostic procedures: high  Management options: moderate    Patient Progress  Patient progress: stable    ED Course       Procedures      Vitals:  Patient Vitals for the past 12 hrs:   Temp Pulse Resp BP SpO2   04/15/17 2018 97.5 °F (36.4 °C) 68 18 140/65 98 %   SpO2 reviewed and within normal limits. Medications ordered:   Medications   ciprofloxacin (CIPRO) 400 mg IVPB (premix) (not administered)   famotidine (PF) (PEPCID) injection 20 mg (20 mg IntraVENous Given 4/15/17 2106)         Lab findings:  Recent Results (from the past 12 hour(s))   CBC WITH AUTOMATED DIFF    Collection Time: 04/15/17  8:43 PM   Result Value Ref Range    WBC 10.0 4.6 - 13.2 K/uL    RBC 3.94 (L) 4.70 - 5.50 M/uL    HGB 11.6 (L) 13.0 - 16.0 g/dL    HCT 35.3 (L) 36.0 - 48.0 %    MCV 89.6 74.0 - 97.0 FL    MCH 29.4 24.0 - 34.0 PG    MCHC 32.9 31.0 - 37.0 g/dL    RDW 14.2 11.6 - 14.5 %    PLATELET 485 126 - 108 K/uL    MPV 9.6 9.2 - 11.8 FL    NEUTROPHILS 90 (H) 40 - 73 %    LYMPHOCYTES 6 (L) 21 - 52 %    MONOCYTES 4 3 - 10 %    EOSINOPHILS 0 0 - 5 %    BASOPHILS 0 0 - 2 %    ABS. NEUTROPHILS 9.0 (H) 1.8 - 8.0 K/UL    ABS. LYMPHOCYTES 0.6 (L) 0.9 - 3.6 K/UL    ABS. MONOCYTES 0.4 0.05 - 1.2 K/UL    ABS. EOSINOPHILS 0.0 0.0 - 0.4 K/UL    ABS. BASOPHILS 0.0 0.0 - 0.06 K/UL    DF AUTOMATED     METABOLIC PANEL, COMPREHENSIVE    Collection Time: 04/15/17  8:43 PM   Result Value Ref Range    Sodium 137 136 - 145 mmol/L    Potassium 4.1 3.5 - 5.5 mmol/L    Chloride 102 100 - 108 mmol/L    CO2 24 21 - 32 mmol/L    Anion gap 11 3.0 - 18 mmol/L    Glucose 144 (H) 74 - 99 mg/dL    BUN 58 (H) 7.0 - 18 MG/DL    Creatinine 2.87 (H) 0.6 - 1.3 MG/DL    BUN/Creatinine ratio 20 12 - 20      GFR est AA 26 (L) >60 ml/min/1.73m2    GFR est non-AA 21 (L) >60 ml/min/1.73m2    Calcium 9.3 8.5 - 10.1 MG/DL    Bilirubin, total 0.5 0.2 - 1.0 MG/DL    ALT (SGPT) 32 16 - 61 U/L    AST (SGOT) 19 15 - 37 U/L    Alk.  phosphatase 151 (H) 45 - 117 U/L    Protein, total 8.3 (H) 6.4 - 8.2 g/dL    Albumin 3.9 3.4 - 5.0 g/dL    Globulin 4.4 (H) 2.0 - 4.0 g/dL    A-G Ratio 0.9 0.8 - 1.7     LIPASE    Collection Time: 04/15/17  8:43 PM   Result Value Ref Range    Lipase 106 73 - 393 U/L   URINALYSIS W/ RFLX MICROSCOPIC    Collection Time: 04/15/17  9:45 PM   Result Value Ref Range    Color YELLOW      Appearance CLEAR      Specific gravity 1.018 1.005 - 1.030      pH (UA) 5.0 5.0 - 8.0      Protein 100 (A) NEG mg/dL    Glucose NEGATIVE  NEG mg/dL    Ketone NEGATIVE  NEG mg/dL    Bilirubin NEGATIVE  NEG      Blood SMALL (A) NEG      Urobilinogen 1.0 0.2 - 1.0 EU/dL    Nitrites NEGATIVE  NEG      Leukocyte Esterase NEGATIVE  NEG     URINE MICROSCOPIC ONLY    Collection Time: 04/15/17  9:45 PM   Result Value Ref Range    WBC 0 to 3 0 - 4 /hpf    RBC 0 to 3 0 - 5 /hpf    Epithelial cells 1+ 0 - 5 /lpf    Bacteria NEGATIVE  NEG /hpf       EKG interpretation by ED Physician:       X-Ray, CT or other radiology findings or impressions:  CT ABD PELV WO CONT   Final Result      XR CHEST PA LAT    (Results Pending)       Orders:  Orders Placed This Encounter    XR CHEST PA LAT     Standing Status:   Standing     Number of Occurrences:   1     Order Specific Question:   Transport     Answer:   Stretcher [5]     Order Specific Question:   Reason for Exam     Answer:   SOB    CT ABD PELV WO CONT     Standing Status:   Standing     Number of Occurrences:   1     Order Specific Question:   Transport     Answer:   Stretcher [5]     Order Specific Question:   Is Patient Allergic to Contrast Dye?      Answer:   Unknown    CBC WITH AUTOMATED DIFF     Standing Status:   Standing     Number of Occurrences:   1    METABOLIC PANEL, COMPREHENSIVE     Standing Status:   Standing     Number of Occurrences:   1    LIPASE     Standing Status:   Standing     Number of Occurrences:   1    URINALYSIS W/ RFLX MICROSCOPIC     Standing Status:   Standing     Number of Occurrences:   1    URINE MICROSCOPIC ONLY     Standing Status:   Standing     Number of Occurrences:   1    INSERT PERIPHERAL IV ONE TIME STAT     Standing Status:   Standing     Number of Occurrences:   1    famotidine (PF) (PEPCID) injection 20 mg    ciprofloxacin (CIPRO) 400 mg IVPB (premix)     Order Specific Question:   Antibiotic Indications     Answer:   Intra-Abdominal Infection       Reevaluation, Progress notes, Consult notes, or additional Procedure notes:   11:54 PM I have reassessed the patient and discussed their results and diagnosis. Pt will be discharged in stable condition. Patient is to return to emergency department if any new or worsening condition. Patient understands and verbalizes agreement with plan. Disposition:  Diagnosis:   1. Enteritis        Disposition: Discharged    Follow-up Information     Follow up With Details Comments Contact Info    Craig Costello MD Go in 2 days  [facility] [address] [place] [address]      [id number] [place] [address] EMERGENCY DEPT Go to As needed, If symptoms worsen [address] [facility]  [phone]           Patient's Medications   Start Taking    No medications on file   Continue Taking    CHLORTHALIDONE (HYGROTEN) 25 MG TABLET    Take 0.5 Tabs by mouth daily. CYANOCOBALAMIN (VITAMIN B-12) 500 MCG TABLET    Take 1,000 mcg by mouth daily. DILTIAZEM CD (CARDIZEM CD) 240 MG ER CAPSULE    Take 1 Cap by mouth daily. FERROUS SULFATE (IRON) 325 MG (65 MG IRON) TABLET    Take  by mouth Daily (before breakfast). FLUTICASONE (FLONASE) 50 MCG/ACTUATION NASAL SPRAY    2 sprays in each nostril daily    FUROSEMIDE (LASIX) 40 MG TABLET    1 tablet by mouth each morning    LISINOPRIL (PRINIVIL, ZESTRIL) 5 MG TABLET    Take  by mouth daily. PANTOPRAZOLE (PROTONIX) 40 MG TABLET    Take 1 Tab by mouth daily. TRAMADOL (ULTRAM) 50 MG TABLET    Take 1 Tab by mouth every six (6) hours as needed for Pain. Max Daily Amount: 200 mg.    These Medications have changed    No medications on file   Stop Taking    No medications on file         Rashida [address] for and in the presence of Osorio Osei MD (04/15/17)  Signed by: Rashida Sierra, April 15, 2017 at 11:54 PM     Physician Attestation  I personally performed the services described in this documentation, reviewed and edited the documentation which was dictated to the scribe in my presence, and it accurately records my words and actions.     Halle Johnson MD (04/15/17)

## 2017-04-16 NOTE — DISCHARGE INSTRUCTIONS
Gastroenteritis: Care Instructions  Your Care Instructions  Gastroenteritis is an illness that may cause nausea, vomiting, and diarrhea. It is sometimes called \"stomach flu. \" It can be caused by bacteria or a virus. You will probably begin to feel better in 1 to 2 days. In the meantime, get plenty of rest and make sure you do not become dehydrated. Dehydration occurs when your body loses too much fluid. Follow-up care is a key part of your treatment and safety. Be sure to make and go to all appointments, and call your doctor if you are having problems. Its also a good idea to know your test results and keep a list of the medicines you take. How can you care for yourself at home? · If your doctor prescribed antibiotics, take them as directed. Do not stop taking them just because you feel better. You need to take the full course of antibiotics. · Drink plenty of fluids to prevent dehydration, enough so that your urine is light yellow or clear like water. Choose water and other caffeine-free clear liquids until you feel better. If you have kidney, heart, or liver disease and have to limit fluids, talk with your doctor before you increase your fluid intake. · Drink fluids slowly, in frequent, small amounts, because drinking too much too fast can cause vomiting. · Begin eating mild foods, such as dry toast, yogurt, applesauce, bananas, and rice. Avoid spicy, hot, or high-fat foods, and do not drink alcohol or caffeine for a day or two. Do not drink milk or eat ice cream until you are feeling better. How to prevent gastroenteritis  · Keep hot foods hot and cold foods cold. · Do not eat meats, dressings, salads, or other foods that have been kept at room temperature for more than 2 hours. · Use a thermometer to check your refrigerator. It should be between 34°F and 40°F.  · Defrost meats in the refrigerator or microwave, not on the kitchen counter. · Keep your hands and your kitchen clean.  Wash your hands, cutting boards, and countertops with hot soapy water frequently. · Cook meat until it is well done. · Do not eat raw eggs or uncooked sauces made with raw eggs. · Do not take chances. If food looks or tastes spoiled, throw it out. When should you call for help? Call 911 anytime you think you may need emergency care. For example, call if:  · You vomit blood or what looks like coffee grounds. · You passed out (lost consciousness). · You pass maroon or very bloody stools. Call your doctor now or seek immediate medical care if:  · You have severe belly pain. · You have signs of needing more fluids. You have sunken eyes, a dry mouth, and pass only a little dark urine. · You feel like you are going to faint. · You have increased belly pain that does not go away in 1 to 2 days. · You have new or increased nausea, or you are vomiting. · You have a new or higher fever. · Your stools are black and tarlike or have streaks of blood. Watch closely for changes in your health, and be sure to contact your doctor if:  · You are dizzy or lightheaded. · You urinate less than usual, or your urine is dark yellow or brown. · You do not feel better with each day that goes by. Where can you learn more? Go to http://annabella-abran.info/. Enter N142 in the search box to learn more about \"Gastroenteritis: Care Instructions. \"  Current as of: May 24, 2016  Content Version: 11.2  © 6686-6609 [employer]. Care instructions adapted under license by [employer] (which disclaims liability or warranty for this information). If you have questions about a medical condition or this instruction, always ask your healthcare professional. [address] any warranty or liability for your use of this information.

## 2017-04-16 NOTE — ED TRIAGE NOTES
Pt states he has epigastric pain since this morning. Reports +n/v.  States he vomited 4-5x of \"mostly liquid\". Denies diarrhea.

## 2017-04-17 NOTE — PROGRESS NOTES
Contacted patient for post ED follow up. No answer. Left message introducing self, role and reason for call. Requested return call. Contact information provided. Will attempt to contact at a later time.

## 2017-05-12 PROBLEM — R25.2 CRAMPS OF RIGHT LOWER EXTREMITY: Status: ACTIVE | Noted: 2017-01-01

## 2017-05-12 NOTE — PROGRESS NOTES
Carmelina Canela 1935, is a [de-identified] y.o. male, who is seen today for possible tick bite and also concerns about cramps, off balance lately, dyspnea. 2 days ago he noticed an itchy area on his lateral left hip and he thought it might be a skin tag or possibly a tick any scratch that it and removed something that was firm but he could not tell what it was. That area is not itchy today. Still feels a little abnormal to touch. He also notes over the last 2 or 3 days he has felt very slightly off balance with certain rapid changes in position of his body. No falls. He is being more careful than usual now because of the dizziness to avoid falls. No tinnitus or loss of hearing. He also notes that he gets periodic cramps at night either in the evenings but never with activity. It occurs mainly in the right leg but occasionally in the left and is quite brief lasting seconds generally. He also notes that Dr. Elvie Yousif gave him tramadol for arthritis in February and he noted after several days of intermittent use that he was more short of breath than usual so he stopped using it and within 3 days the breathing was back to normal.  He has stopped using that medicine. He states that he gets quite good relief from his arthritic pain with Tylenol 3 times a week.     Past Medical History:   Diagnosis Date    Abdominal aortic aneurysm ([facility] [address] 4/17/2014    3.4 cm,  5/12    Allergic rhinitis     Anemia NEC     mildly    Arthritis     Chronic LBP     Gross hematuria     rarely    Headache     HTN (hypertension) 6/7/2011    Hypercholesterolemia     Hyperlipidemia 6/7/2011    Obesity     Polymyalgia ([address] 6/7/2011     Current Outpatient Prescriptions   Medication Sig Dispense Refill    fluticasone (FLONASE) 50 mcg/actuation nasal spray 2 sprays in each nostril daily 1 Bottle 2    furosemide (LASIX) 40 mg tablet 1 tablet by mouth each morning 90 Tab 0    chlorthalidone (HYGROTEN) 25 mg tablet Take 0.5 Tabs by mouth daily. 45 Tab 3    diltiazem CD (CARDIZEM CD) 240 mg ER capsule Take 1 Cap by mouth daily. 90 Cap 3    pantoprazole (PROTONIX) 40 mg tablet Take 1 Tab by mouth daily. 30 Tab 11    lisinopril (PRINIVIL, ZESTRIL) 5 mg tablet Take  by mouth daily.  cyanocobalamin (VITAMIN B-12) 500 mcg tablet Take 1,000 mcg by mouth daily.  ferrous sulfate (IRON) 325 mg (65 mg iron) tablet Take  by mouth Daily (before breakfast). Visit Vitals    /54    Pulse 60    Temp 97.7 °F (36.5 °C) (Oral)    Resp 12    Ht 5' 10\" (1.778 m)    Wt 208 lb 12.8 oz (94.7 kg)    SpO2 96%    BMI 29.96 kg/m2     Ear canals and tympanic membranes are quite unremarkable, the tympanic membranes are a little dull. Grossly normal hearing. Carotids are 2+ without bruits. [place] is absent. Romberg is absent. No nystagmus with change in head position. Lungs are clear to percussion. Good breath sounds with no wheezing or crackles. Heart reveals a regular rhythm with normal S1 and S2 with no gallop click or rub. Extremities reveal no clubbing cyanosis or edema. Pulses are intact. There is a 15 mm oval red area over his left hip and centrally there is a tiny firm area that may be a scab. No definite foreign body. The area is not tender. Assessment: #1. Probable insect bite, possibly a tic. Because of the uncertainty we will go ahead and treat with doxycycline 100 mg twice daily for 10 days, to be taken with food. #2.  Slight imbalance for 2 or 3 days, almost certainly related to his inner ear. He will be very careful over the coming several days to move slowly and be sure he is not going to fall when he first stands up, symptoms should gradually resolve, he will call me if symptoms worsen or fail to resolve.   #3.  Idiopathic muscular cramps that occur mainly at night and in the evenings, most recent electrolytes less than a month ago were normal.  I explained to him there is no treatment for this and symptoms usually do fluctuate and may eventually resolve. #4.  A sense of dyspnea that he thinks was related to use of tramadol, this is unusual but it could have caused some neurologic symptoms perceived as dyspnea. He finds that he gets good relief with Tylenol about 3 times a week so he will use Tylenol. Follow-up as previously planned    Will Funez. Quinton Sapp MD FACP    Please note: This document has been produced using voice recognition software. Unrecognized errors in transcription may be present.

## 2017-05-12 NOTE — MR AVS SNAPSHOT
Visit Information Date & Time Provider Department Dept. Phone Encounter #  
 5/12/2017 12:00 PM Abbe Dean MD Internist of [address] [id number] Your Appointments 5/16/2017 10:25 AM  
LAB with [facility] NURSE VISIT Internist of [facility] [place] [facility] (Velma Ledezma) Appt Note: fasting labs [address], [address] [place] [address] [place]  
  
   
 [address], [address]  
  
    
 5/22/2017  2:30 PM  
Office Visit with Abbe Dean MD  
Internist of [address] Velma Ledezma) Appt Note: 3 month ov with labs [address], Suite [facility] [address] [place]  
  
   
 [address] [facility], [address]  
  
    
 5/31/2017  9:00 AM  
PROCEDURE with [facility] (Velma Ledezma) Appt Note: AAA 6 MO VÍCTOR; cld pt with time/date and prep info; .  
 [address] [facility] [address]  
[phone] [address] [place] [address]  
  
    
 5/31/2017 10:00 AM  
PROCEDURE with [facility] (Velma Ledezma) Appt Note: CV 6 MO VÍCTOR; cld pt with time/date and prep info; .  
 [address] [facility] [address]  
[phone] 6/6/2017 10:15 AM  
Office Visit with Nadia West MD  
[facility] Velma Ledezma) Appt Note: 6 mnth w prep; cld pt with appt date/time; rescheduled from [facility] schedule [facility] [address] [facility]  
[phone] [address] [facility] [address] [facility] [address] Upcoming Health Maintenance Date Due INFLUENZA AGE 9 TO ADULT 8/1/2017 MEDICARE YEARLY EXAM 2/18/2018 GLAUCOMA SCREENING Q2Y 9/13/2018 DTaP/Tdap/Td series (2 - Td) 10/16/2022 Allergies as of 5/12/2017  Review Complete On: 5/12/2017 By: Miguel Olea MD  
  
 Severity Noted Reaction Type Reactions Aspirin  06/07/2011    Other (comments) Gi side effects Nsaids (Non-steroidal Anti-inflammatory Drug)  06/07/2011   Intolerance Other (comments) Gi upset Current Immunizations  Reviewed on 1/20/2015 Name Date Hepatitis B Vaccine 1/10/2002, 8/3/2001, 6/6/2001 Influenza High Dose Vaccine PF 10/14/2016 Influenza Vaccine 10/14/2015 Influenza Vaccine Whole 10/16/2012, 9/21/2011, 9/16/2010, 9/25/2009, 10/3/2008, 10/10/2007, 11/7/2006, 10/13/2005, 11/10/2004, 10/23/2003, 10/17/2002 Pneumococcal Conjugate (PCV-13) 10/14/2015 Pneumococcal Vaccine (Unspecified Type) 10/17/2002 TD Vaccine 10/13/2005, 5/31/1995 TDAP Vaccine 10/16/2012 Not reviewed this visit Vitals BP Pulse Temp Resp Height(growth percentile) Weight(growth percentile) 126/54 60 97.7 °F (36.5 °C) (Oral) 12 5' 10\" (1.778 m) 208 lb 12.8 oz (94.7 kg) SpO2 BMI Smoking Status 96% 29.96 kg/m2 Never Smoker Vitals History BMI and BSA Data Body Mass Index Body Surface Area  
 29.96 kg/m 2 2.16 m 2 Preferred Pharmacy Pharmacy Name Phone [address] [place] [address] [phone] Your Updated Medication List  
  
   
This list is accurate as of: 5/12/17 12:41 PM.  Always use your most recent med list.  
  
  
  
  
 chlorthalidone 25 mg tablet Commonly known as:  Florene Broccoli Take 0.5 Tabs by mouth daily. dilTIAZem  mg ER capsule Commonly known as:  CARDIZEM CD Take 1 Cap by mouth daily. fluticasone 50 mcg/actuation nasal spray Commonly known as:  FLONASE  
2 sprays in each nostril daily  
  
 furosemide 40 mg tablet Commonly known as:  LASIX  
1 tablet by mouth each morning Iron 325 mg (65 mg iron) tablet Generic drug:  ferrous sulfate Take  by mouth Daily (before breakfast). lisinopril 5 mg tablet Commonly known as:  Kim Cozier Take  by mouth daily. pantoprazole 40 mg tablet Commonly known as:  PROTONIX Take 1 Tab by mouth daily. VITAMIN B-12 500 mcg tablet Generic drug:  cyanocobalamin Take 1,000 mcg by mouth daily. Please provide this summary of care documentation to your next provider. Your primary care clinician is listed as Maria A Peacock. Moreno Nicole. If you have any questions after today's visit, please call [phone].

## 2017-05-22 NOTE — MR AVS SNAPSHOT
Visit Information Date & Time Provider Department Dept. Phone Encounter #  
 5/22/2017  2:30 PM Faisal Groves MD Internist of [address] [place] [address] [id number] Follow-up Instructions Follow-up and Disposition History Your Appointments 5/31/2017  9:00 AM  
PROCEDURE with [facility] 2 [facility] ([address] [place] [address]) Appt Note: AAA 6 MO VÍCTOR; cld pt with time/date and prep info; .  
 Radha [place], [place] [address] [facility] [address]  
[phone] [address] [address]  
  
    
 5/31/2017 10:00 AM  
PROCEDURE with [facility] 2 [facility] ([address] [place] [address]) Appt Note: CV 6 MO VÍCTOR; cld pt with time/date and prep info; .  
 Radha [facility], [place] [address] [facility] [address]  
[phone] 6/6/2017 10:15 AM  
Office Visit with Lizeth Washington MD  
[address] [place] [address] and [facility] Specialists [address] [place] [address]) Appt Note: 6 mnth w prep; cld pt with appt date/time; rescheduled from [place] schedule [address] [facility] [address] [facility] [address]  
[phone] [address]  
  
    
 8/22/2017  9:55 AM  
LAB with [facility] NURSE VISIT Internist of [facility] ([address] [place] [address]) Appt Note: labs [address], [address] [place]  
  
   
 [address], [address]  
  
    
 8/29/2017  3:30 PM  
Office Visit with Faisal Groves MD  
Internist of [address] [place] [address]) Appt Note: ov 3mos. rm  
 [address], [address] [place]  
  
   
 [address], [address] Upcoming Health Maintenance Date Due INFLUENZA AGE 9 TO ADULT 8/1/2017 MEDICARE YEARLY EXAM 2/18/2018 GLAUCOMA SCREENING Q2Y 9/13/2018 DTaP/Tdap/Td series (2 - Td) 10/16/2022 Allergies as of 5/22/2017  Review Complete On: 5/22/2017 By: Christal Payne MD  
  
 Severity Noted Reaction Type Reactions Aspirin  06/07/2011    Other (comments) Gi side effects Nsaids (Non-steroidal Anti-inflammatory Drug)  06/07/2011   Intolerance Other (comments) Gi upset Current Immunizations  Reviewed on 1/20/2015 Name Date Hepatitis B Vaccine 1/10/2002, 8/3/2001, 6/6/2001 Influenza High Dose Vaccine PF 10/14/2016 Influenza Vaccine 10/14/2015 Influenza Vaccine Whole 10/16/2012, 9/21/2011, 9/16/2010, 9/25/2009, 10/3/2008, 10/10/2007, 11/7/2006, 10/13/2005, 11/10/2004, 10/23/2003, 10/17/2002 Pneumococcal Conjugate (PCV-13) 10/14/2015 Pneumococcal Vaccine (Unspecified Type) 10/17/2002 TD Vaccine 10/13/2005, 5/31/1995 TDAP Vaccine 10/16/2012 Not reviewed this visit You Were Diagnosed With   
  
 Codes Comments Chronic renal failure, stage 4 (severe) (HCC)    -  Primary ICD-10-CM: N18.4 ICD-9-CM: 585.4 Essential hypertension with goal blood pressure less than 140/90     ICD-10-CM: I10 
ICD-9-CM: 401.9 Hyperlipidemia LDL goal <100     ICD-10-CM: E78.5 ICD-9-CM: 272.4 Bilateral edema of lower extremity     ICD-10-CM: R60.0 ICD-9-CM: 246. 3 Vitals BP Pulse Temp Resp Height(growth percentile) Weight(growth percentile) 134/72 (BP 1 Location: Right arm, BP Patient Position: Sitting) 62 97.8 °F (36.6 °C) (Oral) 14 5' 10\" (1.778 m) 206 lb 6.4 oz (93.6 kg) SpO2 BMI Smoking Status 97% 29.62 kg/m2 Never Smoker Vitals History BMI and BSA Data Body Mass Index Body Surface Area  
 29.62 kg/m 2 2.15 m 2 Preferred Pharmacy Pharmacy Name Phone [address], [address] [place] [address] [phone] Your Updated Medication List  
  
   
This list is accurate as of: 5/22/17  3:13 PM.  Always use your most recent med list.  
  
  
  
  
 chlorthalidone 25 mg tablet Commonly known as:  Mohinder Kirks Take 0.5 Tabs by mouth daily. dilTIAZem  mg ER capsule Commonly known as:  CARDIZEM CD Take 1 Cap by mouth daily. doxycycline 100 mg capsule Commonly known as:  Emily Flank Take 1 Cap by mouth two (2) times a day. fluticasone 50 mcg/actuation nasal spray Commonly known as:  FLONASE  
2 sprays in each nostril daily Iron 325 mg (65 mg iron) tablet Generic drug:  ferrous sulfate Take  by mouth Daily (before breakfast). lisinopril 5 mg tablet Commonly known as:  Dorean Peeks Take  by mouth daily. VITAMIN B-12 500 mcg tablet Generic drug:  cyanocobalamin Take 1,000 mcg by mouth daily. To-Do List   
 Around 08/15/2017 Lab:  CBC WITH AUTOMATED DIFF Around 08/15/2017 Lab:  METABOLIC PANEL, BASIC Introducing [facility]! Dear Jorgito Smiley: Thank you for requesting a [employer] account. Our records indicate that you have previously registered for a [employer] account but its currently inactive. Please call our [employer] support line at [phone]. Additional Information If you have questions, please visit the Frequently Asked Questions section of the [employer] website at [email or web address] Remember, [employer] is NOT to be used for urgent needs. For medical emergencies, dial 911. Now available from your iPhone and Android! Please provide this summary of care documentation to your next provider. Your primary care clinician is listed as Pierre Guzman. Roseline Murphy. If you have any questions after today's visit, please call [phone].

## 2017-05-22 NOTE — PROGRESS NOTES
Maria A Patches 1935, is a [de-identified] y.o. male, who is seen today for reevaluation of hypertension chronic renal failure chronic anemia. He feels pretty well. He did have some edema in both lower extremities and use Lasix for a while and was off chlorthalidone but last week started chlorthalidone again and now is off Lasix. He is breathing well and having no chest pain. He continues to work to keep his weight down. Past Medical History:   Diagnosis Date    Abdominal aortic aneurysm ([facility] 4/17/2014    3.4 cm,  5/12    Allergic rhinitis     Anemia NEC     mildly    Arthritis     Chronic LBP     Gross hematuria     rarely    Headache     HTN (hypertension) 6/7/2011    Hypercholesterolemia     Hyperlipidemia 6/7/2011    Obesity     Polymyalgia ([facility] 6/7/2011     Current Outpatient Prescriptions   Medication Sig Dispense Refill    doxycycline (MONODOX) 100 mg capsule Take 1 Cap by mouth two (2) times a day. 20 Cap 0    fluticasone (FLONASE) 50 mcg/actuation nasal spray 2 sprays in each nostril daily 1 Bottle 2    chlorthalidone (HYGROTEN) 25 mg tablet Take 0.5 Tabs by mouth daily. 45 Tab 3    diltiazem CD (CARDIZEM CD) 240 mg ER capsule Take 1 Cap by mouth daily. 90 Cap 3    lisinopril (PRINIVIL, ZESTRIL) 5 mg tablet Take  by mouth daily.  cyanocobalamin (VITAMIN B-12) 500 mcg tablet Take 1,000 mcg by mouth daily.  ferrous sulfate (IRON) 325 mg (65 mg iron) tablet Take  by mouth Daily (before breakfast). Visit Vitals    /72 (BP 1 Location: Right arm, BP Patient Position: Sitting)    Pulse 62    Temp 97.8 °F (36.6 °C) (Oral)    Resp 14    Ht 5' 10\" (1.778 m)    Wt 206 lb 6.4 oz (93.6 kg)    SpO2 97%    BMI 29.62 kg/m2     Carotids are 2+ without bruits. Lungs are clear to percussion. Good breath sounds with no wheezing or crackles. Heart reveals regular rhythm with a 2/6 systolic murmur at the apex along the left sternal border. No gallop click or rub.   Apical impulse is not palpable. Abdomen is soft and nontender with no hepatosplenomegaly or masses and no bruits. Extremities reveal no clubbing cyanosis or edema. Pulses are intact throughout. Results for orders placed or performed during the hospital encounter of 05/16/17   PTH INTACT   Result Value Ref Range    Calcium 9.0 8.5 - 10.1 MG/DL    PTH, Intact 125.9 (H) 14.0 - 72.0 pg/mL   VITAMIN D, 25 HYDROXY   Result Value Ref Range    Vitamin D 25-Hydroxy 21.5 (L) 30 - 100 ng/mL   FERRITIN   Result Value Ref Range    Ferritin 172 8 - 388 NG/ML   IRON PROFILE   Result Value Ref Range    Iron 47 (L) 50 - 175 ug/dL    TIBC 235 (L) 250 - 450 ug/dL    Iron % saturation 20 %   CBC WITH AUTOMATED DIFF   Result Value Ref Range    WBC 6.7 4.6 - 13.2 K/uL    RBC 3.44 (L) 4.70 - 5.50 M/uL    HGB 10.0 (L) 13.0 - 16.0 g/dL    HCT 31.4 (L) 36.0 - 48.0 %    MCV 91.3 74.0 - 97.0 FL    MCH 29.1 24.0 - 34.0 PG    MCHC 31.8 31.0 - 37.0 g/dL    RDW 15.7 (H) 11.6 - 14.5 %    PLATELET 525 731 - 502 K/uL    MPV 9.6 9.2 - 11.8 FL    NEUTROPHILS 54 40 - 73 %    LYMPHOCYTES 30 21 - 52 %    MONOCYTES 12 (H) 3 - 10 %    EOSINOPHILS 3 0 - 5 %    BASOPHILS 1 0 - 2 %    ABS. NEUTROPHILS 3.7 1.8 - 8.0 K/UL    ABS. LYMPHOCYTES 2.0 0.9 - 3.6 K/UL    ABS. MONOCYTES 0.8 0.05 - 1.2 K/UL    ABS. EOSINOPHILS 0.2 0.0 - 0.4 K/UL    ABS. BASOPHILS 0.0 0.0 - 0.06 K/UL    DF AUTOMATED     METABOLIC PANEL, COMPREHENSIVE   Result Value Ref Range    Sodium 138 136 - 145 mmol/L    Potassium 4.2 3.5 - 5.5 mmol/L    Chloride 108 100 - 108 mmol/L    CO2 20 (L) 21 - 32 mmol/L    Anion gap 10 3.0 - 18 mmol/L    Glucose 81 74 - 99 mg/dL    BUN 33 (H) 7.0 - 18 MG/DL    Creatinine 2.55 (H) 0.6 - 1.3 MG/DL    BUN/Creatinine ratio 13 12 - 20      GFR est AA 30 (L) >60 ml/min/1.73m2    GFR est non-AA 24 (L) >60 ml/min/1.73m2    Calcium 8.4 (L) 8.5 - 10.1 MG/DL    Bilirubin, total 0.5 0.2 - 1.0 MG/DL    ALT (SGPT) 37 16 - 61 U/L    AST (SGOT) 22 15 - 37 U/L    Alk. phosphatase 156 (H) 45 - 117 U/L    Protein, total 6.7 6.4 - 8.2 g/dL    Albumin 3.5 3.4 - 5.0 g/dL    Globulin 3.2 2.0 - 4.0 g/dL    A-G Ratio 1.1 0.8 - 1.7     PHOSPHORUS   Result Value Ref Range    Phosphorus 4.0 2.5 - 4.9 MG/DL     Assessment: #1. Chronic anemia with normal iron levels. Probably mainly related to chronic renal failure. This will be monitored. #2.  Chronic renal failure with creatinine slightly better than usual.  #3. Hypertension controlled. He will continue chlorthalidone 25 mg daily and diltiazem 240 mg daily. Follow-up in 3 months with lab    Via Moiz Moran MD FACP    Please note: This document has been produced using voice recognition software. Unrecognized errors in transcription may be present.

## 2017-05-22 NOTE — PROGRESS NOTES
1. Have you been to the ER, urgent care clinic or hospitalized since your last visit? NO.     2. Have you seen or consulted any other health care providers outside of the [address] [place] [address] since your last visit (Include any pap smears or colon screening)? NO      Do you have an Advanced Directive? NO    Would you like information on Advanced Directives?  NO

## 2017-05-23 NOTE — PROGRESS NOTES
Episode closed:   Patient was admitted to [address] [place] [address] 4/15/17 for enteritis. No hospitalization or ED admission post 30 days from discharge of 4/15/17.

## 2017-05-31 NOTE — PROCEDURES
[facility]   *** FINAL REPORT ***    Name: Swapnil Mcclain  MRN: [id number]       Outpatient  : 10 Jas 1935  HIS Order #: [id number]  [id number] [facility] Visit #: [id number]  Date: 31 May 2017    TYPE OF TEST: Cerebrovascular Duplex    REASON FOR TEST  Bruit    Right Carotid:-             Proximal               Mid                 Distal  cm/s  Systolic  Diastolic  Systolic  Diastolic  Systolic  Diastolic  CCA:    352.4      12.0       91.0      14.0       77.0      18.0  Bulb:  ECA:     97.0      12.0  ICA:     61.0      15.0       73.0      15.0       58.0      15.0  ICA/CCA:  0.8       1.1    ICA Stenosis: <50%    Right Vertebral:-  Finding: Antegrade  Sys:       73.0  Kenia:       15.0    Right Subclavian: <50% stenosis    Left Carotid:-            Proximal                Mid                 Distal  cm/s  Systolic  Diastolic  Systolic  Diastolic  Systolic  Diastolic  CCA:     80.4      16.0                            90.0      16.0  Bulb:  ECA:    111.0       6.0  ICA:    137.0      34.0      155.0      31.0      115.0      21.0  ICA/CCA:  1.8       1.9    ICA Stenosis: 50-69%    Left Vertebral:-  Finding: Antegrade  Sys:      100.0  Kenia:       18.0    Left Subclavian: <50% stenosis    INTERPRETATION/FINDINGS  Duplex images were obtained using 2-D gray scale, color flow and  spectral doppler analysis. 1. Mild <50% stenosis in the right internal carotid artery. 2. Moderate 50-69% stenosis in the left internal carotid artery. 3. No significant stenosis in the external carotid arteries  bilaterally. 4. Antegrade flow in both vertebral arteries. 5. Patent subclavian arteries proximally with biphasic doppler signals   bilaterally. Plaque Morphology:  1. Heterogeneous plaque in the bulb and right ICA. 2. Homogeneous plaque in the bulb and left ICA. No prior to compare    ADDITIONAL COMMENTS    I have personally reviewed the data relevant to the interpretation of  this  study.     TECHNOLOGIST: Sintia Sanchez JUSTINE  Signed: 05/31/2017 11:25 AM    PHYSICIAN: Carin Love MD  Signed: 05/31/2017 04:04 PM

## 2017-05-31 NOTE — PROCEDURES
[facility]   *** FINAL REPORT ***    Name: Arlin Mckinnon  MRN: [id number]       Outpatient  : [address] 1935  HIS Order #: [id number]  [id number] [facility] Visit #: [id number]  Date: 31 May 2017    TYPE OF TEST: Aorto-Iliac Duplex    REASON FOR TEST  AAA    B-Mode:-                 (cm)   1     2     3  Aortic diameter:         AP:     2.1   3.2   4.5                           TV:     2.3   3.4   4.7  Common iliac diameter:   Right: 1.20                           Left:  1.50    Abdominal aortic aneuysm:-  Location:                Juxtarenal  Type:                    Fusiform  Distance from SMA (cm):    Duplex:-                           PSV  Stenosis                           ----- --------------------  Aorta: (1)               173.0 Normal         (2)               131.0         (3)                75.0    Right common iliac:      121.0 Normal  Right external iliac:    Left common iliac:        60.0 Normal  Left external iliac:    INTERPRETATION/FINDINGS  Duplex images were obtained using 2-D gray scale, color flow and  spectral doppler analysis. Techinically difficult due to overlying bowel gas. 1. Juxtarenal, fusiform abdominal aortic aneurysm measuring 4.5 x  4.7cm at its largest below the renals and 3.2 x 3.4 cm trv at the  level of the left renal artery. 2. The right common iliac artery measures 1.2 cm and the left common  iliac artery measures 1.5 cm trv.  3. Celiac artery is patent with elevated velocities suggestive of >70%   stenosis. 4. Patent SMA, proximal renal arteries and MAREK without significant  stenosis. Compared to prior exam, there is an increase in size from 4.1 x 3.7cm  on last exam.    ADDITIONAL COMMENTS    I have personally reviewed the data relevant to the interpretation of  this  study. TECHNOLOGIST: Analilia Noel RDMS  Signed: 2017 11:46 AM    PHYSICIAN: Mana Sanchez.  Joseph Bullard MD  Signed: 2017 04:02 PM

## 2017-06-06 NOTE — MR AVS SNAPSHOT
Visit Information Date & Time Provider Department Dept. Phone Encounter #  
 6/6/2017 10:15 AM MD Dennise Carmona [place] [facility] [phone] [id number] Follow-up Instructions Follow-up and Disposition History Your Appointments 8/22/2017  9:55 AM  
LAB with [facility] NURSE VISIT Internist of [facility] ([facility]) Appt Note: labs [address], [address] [address] [address] [place]  
  
   
 [address], [address]  
  
    
 8/29/2017  3:30 PM  
Office Visit with Faisal Groves MD  
Internist of [address] [place] [address] Appt Note: ov 3mos. rm  
 [address], [address] [address] [address] [place]  
  
   
 [address], [address] Upcoming Health Maintenance Date Due INFLUENZA AGE 9 TO ADULT 8/1/2017 MEDICARE YEARLY EXAM 2/18/2018 GLAUCOMA SCREENING Q2Y 9/13/2018 DTaP/Tdap/Td series (2 - Td) 10/16/2022 Allergies as of 6/6/2017  Review Complete On: 6/6/2017 By: Lizeth Washington MD  
  
 Severity Noted Reaction Type Reactions Aspirin  06/07/2011    Other (comments) Gi side effects Nsaids (Non-steroidal Anti-inflammatory Drug)  06/07/2011   Intolerance Other (comments) Gi upset Current Immunizations  Reviewed on 1/20/2015 Name Date Hepatitis B Vaccine 1/10/2002, 8/3/2001, 6/6/2001 Influenza High Dose Vaccine PF 10/14/2016 Influenza Vaccine 10/14/2015 Influenza Vaccine Whole 10/16/2012, 9/21/2011, 9/16/2010, 9/25/2009, 10/3/2008, 10/10/2007, 11/7/2006, 10/13/2005, 11/10/2004, 10/23/2003, 10/17/2002 Pneumococcal Conjugate (PCV-13) 10/14/2015 Pneumococcal Vaccine (Unspecified Type) 10/17/2002 TD Vaccine 10/13/2005, 5/31/1995 TDAP Vaccine 10/16/2012 Not reviewed this visit You Were Diagnosed With   
  
 Codes Comments Abdominal aortic aneurysm (AAA) without rupture ([facility] [address] [address]    -  Primary ICD-10-CM: I71.4 ICD-9-CM: 539. 4 Vitals BP Pulse Resp Height(growth percentile) Weight(growth percentile) BMI  
 136/64 (BP 1 Location: Left arm, BP Patient Position: Sitting) 77 11 5' 10\" (1.778 m) 206 lb (93.4 kg) 29.56 kg/m2 Smoking Status Never Smoker BMI and BSA Data Body Mass Index Body Surface Area  
 29.56 kg/m 2 2.15 m 2 Preferred Pharmacy Pharmacy Name Phone [address] [place] [address], [address] [place] [address] [phone] Your Updated Medication List  
  
   
This list is accurate as of: 6/6/17 11:38 AM.  Always use your most recent med list.  
  
  
  
  
 chlorthalidone 25 mg tablet Commonly known as:  Isaura Rogue Take 0.5 Tabs by mouth daily. dilTIAZem  mg ER capsule Commonly known as:  CARDIZEM CD Take 1 Cap by mouth daily. doxycycline 100 mg capsule Commonly known as:  Low Padilla Take 1 Cap by mouth two (2) times a day. fluticasone 50 mcg/actuation nasal spray Commonly known as:  FLONASE  
2 sprays in each nostril daily Iron 325 mg (65 mg iron) tablet Generic drug:  ferrous sulfate Take  by mouth Daily (before breakfast). lisinopril 5 mg tablet Commonly known as:  Khalif Bernardston Take  by mouth daily. VITAMIN B-12 500 mcg tablet Generic drug:  cyanocobalamin Take 1,000 mcg by mouth daily. To-Do List   
 Around 07/06/2017 Lab:  CBC W/O DIFF Around 07/06/2017 ECG:  EKG, 12 LEAD, INITIAL Around 07/06/2017 Lab:  METABOLIC PANEL, BASIC Around 07/06/2017 Lab:  PROTHROMBIN TIME + INR Around 07/06/2017 Imaging:  XR CHEST PA LAT Please provide this summary of care documentation to your next provider. Your primary care clinician is listed as Colten Mann. Camilo Hercules.  If you have any questions after today's visit, please call [phone].

## 2017-06-06 NOTE — PROGRESS NOTES
Kristy Rodríguez    Chief Complaint   Patient presents with    Abdominal Aortic Aneurysm       History and Physical    52-year-old male here following up today regarding evaluation of aortic aneurysm. He has had a CT in April for review. He has not had any current abdominal or back pain. 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 He is a daughter that lives in [place]. No current shortness of breath or chest pain. No claudication. No recent strokes or seizures. He has chronic renal insufficiency and hypertension.     Past Medical History:   Diagnosis Date    Abdominal aortic aneurysm ([facility] 4/17/2014    3.4 cm,  5/12    Allergic rhinitis     Anemia NEC     mildly    Arthritis     Chronic LBP     Gross hematuria     rarely    Headache     HTN (hypertension) 6/7/2011    Hypercholesterolemia     Hyperlipidemia 6/7/2011    Obesity     Polymyalgia ([facility] 6/7/2011     Patient Active Problem List   Diagnosis Code    Hyperlipidemia E78.5    [facility] M35.3    Chronic LBP M54.5, G89.29    Allergic rhinitis J30.9    Obesity E66.9    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    Reflux esophagitis K21.0    Abdominal aortic aneurysm (HCC) I71.4    Chronic renal failure N18.9    Chest pain on exertion R07.9    Essential hypertension I10    Cholecystitis K81.9    PAD (peripheral artery disease) (HCC) I73.9    Chronic renal failure, stage 4 (severe) (HCC) N18.4    Hyperlipidemia LDL goal <100 E78.5    Essential hypertension with goal blood pressure less than 140/90 I10    Bilateral edema of lower extremity R60.0    Advance directive discussed with patient Z71.89    Cramps of right lower extremity R25.2     Past Surgical History:   Procedure Laterality Date    ENDOSCOPY, COLON, DIAGNOSTIC      HX HEMORRHOIDECTOMY       Current Outpatient Prescriptions   Medication Sig Dispense Refill    fluticasone (FLONASE) 50 mcg/actuation nasal spray 2 sprays in each nostril daily 1 Bottle 2    chlorthalidone (HYGROTEN) 25 mg tablet Take 0.5 Tabs by mouth daily. 45 Tab 3    diltiazem CD (CARDIZEM CD) 240 mg ER capsule Take 1 Cap by mouth daily. 90 Cap 3    lisinopril (PRINIVIL, ZESTRIL) 5 mg tablet Take  by mouth daily.  cyanocobalamin (VITAMIN B-12) 500 mcg tablet Take 1,000 mcg by mouth daily.  ferrous sulfate (IRON) 325 mg (65 mg iron) tablet Take  by mouth Daily (before breakfast).  doxycycline (MONODOX) 100 mg capsule Take 1 Cap by mouth two (2) times a day. 20 Cap 0     Allergies   Allergen Reactions    Aspirin Other (comments)     Gi side effects    Nsaids (Non-Steroidal Anti-Inflammatory Drug) Other (comments)     Gi upset       Review of Systems    A full review of systems was completed times ten organ systems and was deemed negative unless otherwise mentioned in the HPI. Physical   Visit Vitals    /64 (BP 1 Location: Left arm, BP Patient Position: Sitting)    Pulse 77    Resp 11    Ht 5' 10\" (1.778 m)    Wt 206 lb (93.4 kg)    BMI 29.56 kg/m2       Appears well today his wife with him in the waiting room  Head is normocephalic pupils are reactive no facial symmetry  Neck no JVD  Chest clear  Cardiac regular  Abdomen soft nontender  Extremities no edema  No signs of acute arterial insufficiency  CT shows a aorta has expanded to 5 cm now    Impression/Plan:     ICD-10-CM ICD-9-CM    1.  Abdominal aortic aneurysm (AAA) without rupture (HCC) I71.4 441.4 XR CHEST PA LAT      EKG, 12 LEAD, INITIAL      CBC W/O DIFF      METABOLIC PANEL, BASIC      PROTHROMBIN TIME + INR    appears by CT to be an endovascular candidate  Have avoided contrast at this point regarding renal insufficiency  We will confirm sizing with [place] at time of repair  Plan for endovascular repair of aortic aneurysm for expanding aortic aneurysm  Orders Placed This Encounter    XR CHEST PA LAT    CBC W/O DIFF    METABOLIC PANEL, BASIC    PROTHROMBIN TIME + INR    EKG, 12 LEAD, INITIAL       Follow-up Disposition: Not on File    Nkechi Biswas MD    PLEASE NOTE:  This document has been produced using voice recognition software. Unrecognized errors in transcription may be present.

## 2017-08-01 NOTE — PROGRESS NOTES
Filomena Yu    Chief Complaint   Patient presents with    Abdominal Aortic Aneurysm       History and Physical    52-year-old male with expanding aortic aneurysm now up to 5 cm. He came in today for discussion of cautions about the procedure. He is treated for hypertension and chronic renal disease. He has had noncontrast CT is at this point. Is a caregiver for his wife is arrange for his daughter to come down to watch out for him. We will plan to do the surgery on Thursday. His key points will be cardiac risk pulmonary risk and of course renal injury.     Past Medical History:   Diagnosis Date    Abdominal aortic aneurysm ([facility] 4/17/2014    3.4 cm,  5/12    Allergic rhinitis     Anemia NEC     mildly    Arthritis     Chronic LBP     Gross hematuria     rarely    Headache     HTN (hypertension) 6/7/2011    Hypercholesterolemia     Hyperlipidemia 6/7/2011    Obesity     Polymyalgia ([facility] 6/7/2011     Patient Active Problem List   Diagnosis Code    Hyperlipidemia E78.5    Polymyalgia ([facility] M35.3    Chronic LBP M54.5, G89.29    Allergic rhinitis J30.9    Obesity E66.9    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    Reflux esophagitis K21.0    Abdominal aortic aneurysm (HCC) I71.4    Chronic renal failure N18.9    Chest pain on exertion R07.9    Essential hypertension I10    Cholecystitis K81.9    PAD (peripheral artery disease) (HCC) I73.9    Chronic renal failure, stage 4 (severe) (HCC) N18.4    Hyperlipidemia LDL goal <100 E78.5    Essential hypertension with goal blood pressure less than 140/90 I10    Bilateral edema of lower extremity R60.0    Advance directive discussed with patient Z71.89    Cramps of right lower extremity R25.2     Past Surgical History:   Procedure Laterality Date    ENDOSCOPY, COLON, DIAGNOSTIC      HX HEMORRHOIDECTOMY       Current Outpatient Prescriptions   Medication Sig Dispense Refill    cholecalciferol, vitamin D3, (VITAMIN D3) 2,000 unit tab Take 2,000 Units by mouth daily.  dilTIAZem CD (CARDIZEM CD) 240 mg ER capsule TAKE 1 CAPSULE BY MOUTH ONCE DAILY 90 Cap 3    fluticasone (FLONASE) 50 mcg/actuation nasal spray 2 sprays in each nostril daily 1 Bottle 2    chlorthalidone (HYGROTEN) 25 mg tablet Take 0.5 Tabs by mouth daily. 45 Tab 3    lisinopril (PRINIVIL, ZESTRIL) 5 mg tablet Take  by mouth daily.  cyanocobalamin (VITAMIN B-12) 500 mcg tablet Take 1,000 mcg by mouth daily.  ferrous sulfate (IRON) 325 mg (65 mg iron) tablet Take  by mouth Daily (before breakfast). Allergies   Allergen Reactions    Aspirin Other (comments)     Gi side effects    Nsaids (Non-Steroidal Anti-Inflammatory Drug) Other (comments)     Gi upset       Review of Systems    A full review of systems was completed times ten organ systems and was deemed negative unless otherwise mentioned in the HPI. Physical   Visit Vitals    /86 (BP 1 Location: Left arm, BP Patient Position: Sitting)    Pulse 69    Resp 16    Ht 5' 11\" (1.803 m)    Wt 200 lb (90.7 kg)    BMI 27.89 kg/m2       Appears well today's here with his wife no acute distress  Head is normocephalic pupils are reactive  Neck no JVD no bruit  Chest clear  Cardiac regular  Abdomen soft nontender  Extremities without edema  Range of motion strength are equal  Skin intact  Reviewed the CAT scan he is is 5 cm aneurysm somewhat unclear at the neck this will require intraoperative ultrasound  Discussed at length with him again avoid contrast but there will be some contrast for accuracy of placement    Impression/Plan:     ICD-10-CM ICD-9-CM    1.  Abdominal aortic aneurysm (AAA) without rupture (HCC) I71.4 441.4 XR CHEST PA LAT      EKG, 12 LEAD, INITIAL      CBC W/O DIFF      METABOLIC PANEL, BASIC      PROTHROMBIN TIME + INR   He tells me he feels he has a good understanding of the procedure and the recovery  We will plan for endovascular repair of aortic aneurysm    Orders Placed This Encounter    XR CHEST PA LAT    CBC W/O DIFF    METABOLIC PANEL, BASIC    PROTHROMBIN TIME + INR    EKG, 12 LEAD, INITIAL       Follow-up Disposition: Not on File    Zhao Escobar MD    PLEASE NOTE:  This document has been produced using voice recognition software. Unrecognized errors in transcription may be present.

## 2017-08-03 NOTE — MR AVS SNAPSHOT
Visit Information Date & Time Provider Department Dept. Phone Encounter #  
 8/3/2017  9:40 AM Valeria Langford MD [facility] [address] [id number] Your Appointments 8/22/2017  9:55 AM  
LAB with IOC NURSE VISIT Internist of [facility] ([address]) Appt Note: labs [address] [employer], [address] [facility]  
  
   
 [address] [facility]  
  
    
 8/22/2017 10:45 AM  
HOSPITAL DISCHARGE with MD Krys Crespo [facility]) Appt Note: DC EVAR  
 [address]  
[phone] [address] [place] [address]  
  
    
 8/29/2017  3:30 PM  
Office Visit with Charla Orona MD  
Internist of [facility] [facility]) Appt Note: ov 3mos. rm  
 [address] [facility], [address] Betty Shingles [address]  
  
   
 [address] [facility] Upcoming Health Maintenance Date Due INFLUENZA AGE 9 TO ADULT 8/1/2017 MEDICARE YEARLY EXAM 2/18/2018 GLAUCOMA SCREENING Q2Y 9/13/2018 DTaP/Tdap/Td series (2 - Td) 10/16/2022 Allergies as of 8/3/2017  Review Complete On: 8/1/2017 By: Dickson Pradhan MD  
  
 Severity Noted Reaction Type Reactions Aspirin  06/07/2011    Other (comments) Gi side effects Nsaids (Non-steroidal Anti-inflammatory Drug)  06/07/2011   Intolerance Other (comments) Gi upset Current Immunizations  Reviewed on 1/20/2015 Name Date Hepatitis B Vaccine 1/10/2002, 8/3/2001, 6/6/2001 Influenza High Dose Vaccine PF 10/14/2016 Influenza Vaccine 10/14/2015 Influenza Vaccine Whole 10/16/2012, 9/21/2011, 9/16/2010, 9/25/2009, 10/3/2008, 10/10/2007, 11/7/2006, 10/13/2005, 11/10/2004, 10/23/2003, 10/17/2002 Pneumococcal Conjugate (PCV-13) 10/14/2015 TD Vaccine 10/13/2005, 5/31/1995 TDAP Vaccine 10/16/2012 ZZZ-RETIRED (DO NOT USE) Pneumococcal Vaccine (Unspecified Type) 10/17/2002 Not reviewed this visit You Were Diagnosed With   
  
 Codes Comments PAF (paroxysmal atrial fibrillation) ([facility]    -  Primary ICD-10-CM: I48.0 ICD-9-CM: 427.31 Bilateral edema of lower extremity     ICD-10-CM: R60.0 ICD-9-CM: 505. 3 Hyperlipidemia LDL goal <100     ICD-10-CM: E78.5 ICD-9-CM: 272.4 Essential hypertension with goal blood pressure less than 140/90     ICD-10-CM: I10 
ICD-9-CM: 401.9 SOB (shortness of breath)     ICD-10-CM: R06.02 
ICD-9-CM: 786.05   
 Murmur     ICD-10-CM: R01.1 ICD-9-CM: 576. 2 Vitals BP Pulse Height(growth percentile) Weight(growth percentile) SpO2 BMI  
 144/74 64 5' 11\" (1.803 m) 212 lb (96.2 kg) 96% 29.57 kg/m2 Smoking Status Never Smoker Vitals History BMI and BSA Data Body Mass Index Body Surface Area  
 29.57 kg/m 2 2.2 m 2 Preferred Pharmacy Pharmacy Name Phone [address] [place] [address], [address] [place] [address] [phone] Your Updated Medication List  
  
   
This list is accurate as of: 8/3/17 10:58 AM.  Always use your most recent med list.  
  
  
  
  
 chlorthalidone 25 mg tablet Commonly known as:  Natali Kelp Take 0.5 Tabs by mouth daily. dilTIAZem  mg ER capsule Commonly known as:  CARDIZEM CD  
TAKE 1 CAPSULE BY MOUTH ONCE DAILY  
  
 fluticasone 50 mcg/actuation nasal spray Commonly known as:  FLONASE  
2 sprays in each nostril daily Iron 325 mg (65 mg iron) tablet Generic drug:  ferrous sulfate Take  by mouth Daily (before breakfast). lisinopril 5 mg tablet Commonly known as:  Therisa Semen Take 5 mg by mouth daily. VITAMIN B-12 500 mcg tablet Generic drug:  cyanocobalamin Take 1,000 mcg by mouth daily. VITAMIN D3 2,000 unit Tab Generic drug:  cholecalciferol (vitamin D3) Take 2,000 Units by mouth daily. We Performed the Following AMB POC EKG ROUTINE W/ 12 LEADS, INTER & REP [75733 CPT(R)] To-Do List   
 08/03/2017 ECHO:  2D ECHO COMPLETE ADULT (TTE) W OR WO CONTR   
  
 08/04/2017 10:30 AM  
  Appointment with [id number] MACHINE (WT ) at [facility] NON-INVASIVE CARD ([phone]) Age Limit for ALL Heart procedures @ all [facility] facilities: 18 yrs and older only. Under the age of 25, refer to [facility] ([phone]). Wt Limit: 350lbs. This study requires patient to bring a written physician's order or MD office may fax the order to Central Scheduling at [phone]. Patient needs to bring a current list of all medications. No preparation is required for this study. Patients should report 15 minutes prior to their appointment time to the [facility], [address] [place] [address]/[address]. Please provide this summary of care documentation to your next provider. Your primary care clinician is listed as Leesa Hogue. Kalyani Joiner. If you have any questions after today's visit, please call [phone].

## 2017-08-03 NOTE — PROGRESS NOTES
HISTORY OF PRESENT ILLNESS  Obdulia Allen is a 81 y.o. male. HPI    Patient presents for a new office visit. He was referred here for preoperative cardiac evaluation prior to undergoing an endovascular AAA repair which is scheduled for next week. He was briefly followed by Dr. Ash Chavarria, but has not been seen in at least a year. He has a remote history of paroxysmal atrial fibrillation in 2011, was briefly hospitalized for this, but this has not reoccurred. He could not tolerate oral anticoagulation or antiplatelet agents because of recurrent GI bleeding. He denies any recent cardiac workup other than an EKG. He last underwent an echocardiogram and a nuclear stress test in 2011, both of which were unremarkable. Recently, he denies any chest pain or shortness of breath. He did have some exertional dyspnea last year which improved with diuretic therapy. He denies any recurrent palpitations, dizziness or syncope. No major change in his activity tolerance which is very low to begin with. He denies any orthopnea, no PND, leg swelling or claudication. Past Medical History:   Diagnosis Date    Abdominal aortic aneurysm ([address] 4/17/2014    3.4 cm,  5/12    Allergic rhinitis     Anemia NEC     mildly    Arthritis     Chronic LBP     CKD (chronic kidney disease) stage 4, GFR 15-29 ml/min ([facility])     Gross hematuria     rarely    Headache     History of echocardiogram 07/2011    EF 55-60%, mild LVH, aortic valve sclerosis without stenosis    History of nuclear stress test 07/2011    EF 70%, no ischemia, no infarct, normal study    HTN (hypertension) 6/7/2011    Hypercholesterolemia     Obesity     PAF (paroxysmal atrial fibrillation) ([address] 2011    Polymyalgia ([address] 6/7/2011     Current Outpatient Prescriptions   Medication Sig Dispense Refill    cholecalciferol, vitamin D3, (VITAMIN D3) 2,000 unit tab Take 2,000 Units by mouth daily.       dilTIAZem CD (CARDIZEM CD) 240 mg ER capsule TAKE 1 CAPSULE BY MOUTH ONCE DAILY 90 Cap 3    fluticasone (FLONASE) 50 mcg/actuation nasal spray 2 sprays in each nostril daily 1 Bottle 2    chlorthalidone (HYGROTEN) 25 mg tablet Take 0.5 Tabs by mouth daily. 45 Tab 3    cyanocobalamin (VITAMIN B-12) 500 mcg tablet Take 1,000 mcg by mouth daily.  ferrous sulfate (IRON) 325 mg (65 mg iron) tablet Take  by mouth Daily (before breakfast).  lisinopril (PRINIVIL, ZESTRIL) 5 mg tablet Take 5 mg by mouth daily. Allergies   Allergen Reactions    Aspirin Other (comments)     Gi side effects    Nsaids (Non-Steroidal Anti-Inflammatory Drug) Other (comments)     Gi upset      Social History   Substance Use Topics    Smoking status: Never Smoker    Smokeless tobacco: Never Used    Alcohol use No     Family History   Problem Relation Age of Onset    Cancer Other      aunt, breast;         Review of Systems   Constitutional: Negative for chills, fever and weight loss. HENT: Negative for nosebleeds. Eyes: Negative for blurred vision and double vision. Respiratory: Negative for cough, shortness of breath and wheezing. Cardiovascular: Negative for chest pain, palpitations, orthopnea, claudication, leg swelling and PND. Gastrointestinal: Negative for abdominal pain, heartburn, nausea and vomiting. Genitourinary: Negative for dysuria and hematuria. Musculoskeletal: Negative for falls and myalgias. Skin: Negative for rash. Neurological: Negative for dizziness, focal weakness and headaches. Endo/Heme/Allergies: Does not bruise/bleed easily. Psychiatric/Behavioral: Negative for substance abuse. Visit Vitals    /74    Pulse 64    Ht 5' 11\" (1.803 m)    Wt 96.2 kg (212 lb)    SpO2 96%    BMI 29.57 kg/m2       Physical Exam   Constitutional: He is oriented to person, place, and time. He appears well-developed and well-nourished. HENT:   Head: Normocephalic and atraumatic. Eyes: Conjunctivae are normal.   Neck: Neck supple.  No JVD present. Carotid bruit is not present. Cardiovascular: Normal rate, regular rhythm, S1 normal, S2 normal and normal pulses. Occasional extrasystoles are present. Exam reveals no gallop and no S3.    Murmur heard. Holosystolic murmur is present with a grade of 2/6  at the lower left sternal border  Pulmonary/Chest: Breath sounds normal. He has no wheezes. He has no rales. Abdominal: Soft. Bowel sounds are normal. There is no tenderness. Musculoskeletal: He exhibits no edema, tenderness or deformity. Neurological: He is alert and oriented to person, place, and time. Skin: Skin is warm and dry. Psychiatric: He has a normal mood and affect. His behavior is normal. Thought content normal.     EKG: Normal sinus rhythm, normal axis, borderline inferior Q waves, cannot exclude prior inferior infarct, poor R-wave progression, nonspecific IVCD, nonspecific ST abnormality. ASSESSMENT and PLAN    ICD-10-CM ICD-9-CM    1. PAF (paroxysmal atrial fibrillation) ([facility]) I48.0 427.31 AMB POC EKG ROUTINE W/ 12 LEADS, INTER & REP      2D ECHO COMPLETE ADULT (TTE) W OR WO CONTR   2. Essential hypertension with goal blood pressure less than 140/90 I10 401.9    3. SOB (shortness of breath) R06.02 786.05 2D ECHO COMPLETE ADULT (TTE) W OR WO CONTR   4. Murmur R01.1 785.2 2D ECHO COMPLETE ADULT (TTE) W OR WO CONTR   5. Abdominal aortic aneurysm (AAA) without rupture ([facility]) I71.4 441.4    6. CKD (chronic kidney disease), stage 4 (severe) ([facility]) N18.4 585. 4      Paroxysmal atrial fibrillation. No recurrence over the past 5-6 years according to the patient. He has been treated with diltiazem for rate control, but unfortunately could not tolerate any antiplatelet or anticoagulants due to GI bleeding issues. Abnormal EKG. I would like to repeat an echocardiogram to evaluate his overall LV function since his last echocardiogram was in 2011. No concerning symptoms for angina or heart failure. Cardiac murmur.   This may be from aortic valve sclerosis or mild stenosis. This will be evaluate the echocardiogram as described above. Dyspnea on exertion. This was present last year, but has since improved and has not returned. Essential hypertension. Patient's blood pressure appears reasonable on his current antihypertensive regimen. AAA. Patient is scheduled for an endovascular repair next week. As long as his echocardiogram is unremarkable, he is at an acceptable risk to proceed with this procedure. Follow-up in 6 months, sooner if needed.

## 2017-08-03 NOTE — PROGRESS NOTES
1. Have you been to the ER, urgent care clinic since your last visit? Hospitalized since your last visit? No     2. Have you seen or consulted any other health care providers outside of the [address] [place] [address] since your last visit? Include any pap smears or colon screening.  No

## 2017-08-08 NOTE — H&P
History and Physical    81year-old male with expanding aortic aneurysm now up to 5 cm. He came in today for discussion of cautions about the procedure. He is treated for hypertension and chronic renal disease. He has had noncontrast CT is at this point. Is a caregiver for his wife is arrange for his daughter to come down to watch out for him. We will plan to do the surgery on Thursday. His key points will be cardiac risk pulmonary risk and of course renal injury.          Past Medical History:   Diagnosis Date    Abdominal aortic aneurysm ([facility] 4/17/2014     3.4 cm,  5/12    Allergic rhinitis      Anemia NEC       mildly    Arthritis      Chronic LBP      Gross hematuria       rarely    Headache      HTN (hypertension) 6/7/2011    Hypercholesterolemia      Hyperlipidemia 6/7/2011    Obesity      Polymyalgia ([facility] 6/7/2011           Patient Active Problem List   Diagnosis Code    Hyperlipidemia E78.5    Polymyalgia ([facility] M35.3    Chronic LBP M54.5, G89.29    Allergic rhinitis J30.9    Obesity E66.9    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    Reflux esophagitis K21.0    Abdominal aortic aneurysm (HCC) I71.4    Chronic renal failure N18.9    Chest pain on exertion R07.9    Essential hypertension I10    Cholecystitis K81.9    PAD (peripheral artery disease) (HCC) I73.9    Chronic renal failure, stage 4 (severe) (HCC) N18.4    Hyperlipidemia LDL goal <100 E78.5    Essential hypertension with goal blood pressure less than 140/90 I10    Bilateral edema of lower extremity R60.0    Advance directive discussed with patient Z71.89    Cramps of right lower extremity R25.2            Past Surgical History:   Procedure Laterality Date    ENDOSCOPY, COLON, DIAGNOSTIC        HX HEMORRHOIDECTOMY                 Current Outpatient Prescriptions   Medication Sig Dispense Refill    cholecalciferol, vitamin D3, (VITAMIN D3) 2,000 unit tab Take 2,000 Units by mouth daily.        dilTIAZem CD (CARDIZEM CD) 240 mg ER capsule TAKE 1 CAPSULE BY MOUTH ONCE DAILY 90 Cap 3    fluticasone (FLONASE) 50 mcg/actuation nasal spray 2 sprays in each nostril daily 1 Bottle 2    chlorthalidone (HYGROTEN) 25 mg tablet Take 0.5 Tabs by mouth daily. 45 Tab 3    lisinopril (PRINIVIL, ZESTRIL) 5 mg tablet Take  by mouth daily.        cyanocobalamin (VITAMIN B-12) 500 mcg tablet Take 1,000 mcg by mouth daily.        ferrous sulfate (IRON) 325 mg (65 mg iron) tablet Take  by mouth Daily (before breakfast).                Allergies   Allergen Reactions    Aspirin Other (comments)       Gi side effects    Nsaids (Non-Steroidal Anti-Inflammatory Drug) Other (comments)       Gi upset         Review of Systems    A full review of systems was completed times ten organ systems and was deemed negative unless otherwise mentioned in the HPI.     Physical        Visit Vitals    /86 (BP 1 Location: Left arm, BP Patient Position: Sitting)    Pulse 69    Resp 16    Ht 5' 11\" (1.803 m)    Wt 200 lb (90.7 kg)    BMI 27.89 kg/m2         Appears well today's here with his wife no acute distress  Head is normocephalic pupils are reactive  Neck no JVD no bruit  Chest clear  Cardiac regular  Abdomen soft nontender  Extremities without edema  Range of motion strength are equal  Skin intact  Reviewed the CAT scan he is is 5 cm aneurysm somewhat unclear at the neck this will require intraoperative ultrasound  Discussed at length with him again avoid contrast but there will be some contrast for accuracy of placement     Impression/Plan:       ICD-10-CM ICD-9-CM     1.  Abdominal aortic aneurysm (AAA) without rupture (HCC) I71.4 441.4 XR CHEST PA LAT         EKG, 12 LEAD, INITIAL         CBC W/O DIFF         METABOLIC PANEL, BASIC         PROTHROMBIN TIME + INR   He tells me he feels he has a good understanding of the procedure and the recovery  We will plan for endovascular repair of aortic aneurysm

## 2017-08-09 NOTE — PROGRESS NOTES
Abnormal EKG. I would like to repeat an echocardiogram to evaluate his overall LV function since his last echocardiogram was in 2011. No concerning symptoms for angina or heart failure.      AAA. Patient is scheduled for an endovascular repair next week. As long as his echocardiogram is unremarkable, he is at an acceptable risk to proceed with this procedure.

## 2017-08-10 PROBLEM — I71.40 AAA (ABDOMINAL AORTIC ANEURYSM) WITHOUT RUPTURE: Status: ACTIVE | Noted: 2017-01-01

## 2017-08-10 NOTE — ROUTINE PROCESS
TRANSFER - IN REPORT:    Verbal report received from Dr. Dave Tavera / MariannaRN(name) on Agu[place] Roque  being received from OR(unit) for routine post - op      Report consisted of patients Situation, Background, Assessment and   Recommendations(SBAR). Information from the following report(s) SBAR, Kardex, OR Summary, Procedure Summary and Recent Results was reviewed with the receiving nurse. Opportunity for questions and clarification was provided. Assessment completed upon patients arrival to unit and care assumed. 1200  Pt opens eyes to verbal commands, states he feels some discomfort generalized, pedal pulses palpable and audible with doppler. Pt attached to telemetry, pulse ox and A-line to monitor, Hrt rate 40's and 50's, SpO2 % on 6L NC. Bilateral groin incistions clean dry intact. No swelling or bleeding at this time. 200  Dr. Carmen Burris at bedside talking with pt.    1400  Pedal pulses palpable, pt status unchanged, sleeping at this time. 1600  No change in pt status, resting with eyes closed. Scheduled med's given. 1800  Pt up to bedside commode, formed brown stool, tolerated well. Small amount of bloody drainage from tip of penis. Granda insertion? Bedside, Verbal and Written shift change report given to Cary Pro (oncoming nurse) by J. Rachael Boas (offgoing nurse). Report included the following information SBAR, Kardex, Intake/Output and Recent Results.

## 2017-08-10 NOTE — IP AVS SNAPSHOT
[address] [address] 
[phone] Patient: Sharon Medina MRN: [id number] You are allergic to the following Allergen Reactions Aspirin Other (comments) Gi side effects Nsaids (Non-Steroidal Anti-Inflammatory Drug) Other (comments) Gi upset Recent Documentation Height Weight BMI Smoking Status 1.803 m 91.9 kg 28.26 kg/m2 Never Smoker Emergency Contacts Name Discharge Info Relation Home Work Mobile Le,Fatou  Spouse [3] [phone] [address] [facility]  Child [2] [phone] About your hospitalization You were admitted on:  August 10, 2017 You last received care in the:  [facility] 2 CV INTNSV CARE You were discharged on:  August 11, 2017 Unit phone number:  [phone] Why you were hospitalized Your primary diagnosis was:  Not on File Your diagnoses also included:  Aaa (Abdominal Aortic Aneurysm) Without Rupture (Hcc) Providers Seen During Your Hospitalizations Provider Role Specialty Primary office phone Maged Lobo MD Attending Provider Vascular Surgery [phone] Your Primary Care Physician (PCP) Primary Care Physician Office Phone Office Fax Humberto Nunes [phone] Follow-up Information Follow up With Details Comments Contact Info Danika Avalos MD  Monday 08/14/2017 at 3pm [address] 
[phone] Your Appointments Monday August 14, 2017  3:00 PM EDT TRANSITIONAL CARE MANAGEMENT with Danika Avalos MD  
[facility] [address] [place] [address]  
[phone] Tuesday August 22, 2017  9:55 AM EDT  
LAB with [facility] NURSE VISIT [facility] [place] (Zenobia Abe) [address] [place] [address] [phone] Tuesday August 22, 2017 10:45 AM EDT HOSPITAL DISCHARGE with Jorgito Juarez MD  
600 [facility] 3651 [facility]) 2300 [facility] 353 200 [facility]  
[phone] Tuesday August 29, 2017  3:30 PM EDT Office Visit with Hayder Estevez MD  
Internist of [address]) [address] [facility], [facility] 200 [facility]  
[phone] Current Discharge Medication List  
  
START taking these medications Dose & Instructions Dispensing Information Comments Morning Noon Evening Bedtime  
 oxyCODONE-acetaminophen 5-325 mg per tablet Commonly known as:  PERCOCET Your next dose is: Take as needed for pain Dose:  1-2 Tab Take 1-2 Tabs by mouth every four (4) hours as needed for Pain. Max Daily Amount: 12 Tabs. Quantity:  40 Tab Refills:  0 CONTINUE these medications which have NOT CHANGED Dose & Instructions Dispensing Information Comments Morning Noon Evening Bedtime  
 chlorthalidone 25 mg tablet Commonly known as:  Evy Rasta Your next dose is: Take as you were per home routine Dose:  12.5 mg Take 0.5 Tabs by mouth daily. Quantity:  45 Tab Refills:  3  
     
   
   
   
  
 dilTIAZem  mg ER capsule Commonly known as:  CARDIZEM CD Your next dose is:  Tomorrow 08/12/2017 TAKE 1 CAPSULE BY MOUTH ONCE DAILY Quantity:  90 Cap Refills:  3  
     
   
   
   
  
 fluticasone 50 mcg/actuation nasal spray Commonly known as:  Justynanita Summers Your next dose is: Take as you were at home 2 sprays in each nostril daily Quantity:  1 Bottle Refills:  2 Iron 325 mg (65 mg iron) tablet Generic drug:  ferrous sulfate Your next dose is: Take as you were at home Take  by mouth Daily (before breakfast). Refills:  0 lisinopril 5 mg tablet Commonly known as:  Lynn Lee Your next dose is:  Tomorrow 08/12/2017 Dose:  5 mg Take 5 mg by mouth daily. Refills:  0  
     
   
   
   
  
 VITAMIN B-12 500 mcg tablet Generic drug:  cyanocobalamin Your next dose is: Take as you were at home Dose:  1000 mcg Take 1,000 mcg by mouth daily. Refills:  0  
     
   
   
   
  
 VITAMIN D3 2,000 unit Tab Generic drug:  cholecalciferol (vitamin D3) Your next dose is: Take as you were at home Dose:  2000 Units Take 2,000 Units by mouth daily. Refills:  0 Where to Get Your Medications Information on where to get these meds will be given to you by the nurse or doctor. ! Ask your nurse or doctor about these medications  
  oxyCODONE-acetaminophen 5-325 mg per tablet Discharge Instructions Endovascular Aortic Aneurysm Repair: What to Expect at Home Your Recovery Endovascular aortic aneurysm repair is a procedure to fix a weak and bulging section of the aorta. The aorta is the large blood vessel (artery) that carries blood from the heart through the belly to the rest of the body. The doctor put a man-made tube called a graft inside the aneurysm. Blood will pass through the graft in the aorta without pushing on the aneurysm. You can expect the cuts (incisions) in your groin to be sore for 1 to 2 weeks. If you have stitches or staples in your incisions, the doctor may need to take them out. You may feel more tired than usual for 1 to 2 weeks after surgery. You may be able to do many of your usual activities after 1 to 2 weeks. But you will probably need up to 4 weeks to fully recover. Strenuous activities will not hurt the graft in your aorta, but they may cause problems with the incisions in your groin. You can be more active when your groin is no longer sore. This care sheet gives you a general idea about how long it will take for you to recover. But each person recovers at a different pace. Follow the steps below to get better as quickly as possible. How can you care for yourself at home? Activity · Rest when you feel tired. Getting enough sleep will help you recover. · Try to walk each day. Start by walking a little more than you did the day before. Bit by bit, increase the amount you walk. Walking boosts blood flow and helps prevent pneumonia and constipation. · Avoid strenuous activities, such as bicycle riding, jogging, weight lifting, or aerobic exercise. Your doctor will tell you when it's okay to do strenuous activity. · Ask your doctor when you can drive again. · You will probably need to take at least 1 to 2 weeks off from work. It depends on the type of work you do and how you feel. · You may shower as usual. Pat the incisions dry. Do not take a bath for the first 2 weeks, or until your doctor tells you it is okay. Diet · You can eat your normal diet. If your stomach is upset, try bland, low-fat foods like plain rice, broiled chicken, toast, and yogurt. · Drink plenty of fluids (unless your doctor tells you not to). · You may notice that your bowel movements are not regular right after your surgery. This is common. Try to avoid constipation and straining with bowel movements. You may want to take a fiber supplement every day. If you have not had a bowel movement after a couple of days, ask your doctor about taking a mild laxative. Medicines · Your doctor will tell you if and when you can restart your medicines. He or she will also give you instructions about taking any new medicines. · If you take blood thinners, such as warfarin (Coumadin), clopidogrel (Plavix), or aspirin, be sure to talk to your doctor. He or she will tell you if and when to start taking those medicines again.  Make sure that you understand exactly what your doctor wants you to do. · Be safe with medicines. Take pain medicines exactly as directed. ¨ If the doctor gave you a prescription medicine for pain, take it as prescribed. ¨ If you are not taking a prescription pain medicine, ask your doctor if you can take an over-the-counter medicine. ¨ Do not take two or more pain medicines at the same time unless the doctor told you to. Many pain medicines have acetaminophen, which is Tylenol. Too much acetaminophen (Tylenol) can be harmful. · If you think your pain medicine is making you sick to your stomach: 
¨ Take your medicine after meals (unless your doctor has told you not to). ¨ Ask your doctor for a different pain medicine. · If your doctor prescribed antibiotics, take them as directed. Do not stop taking them just because you feel better. You need to take the full course of antibiotics. Incision care · You can shower tomorrow (Saturday 08/12/2017). Do not scrub incision just allow soap and water to run over incision and pat dry with towel. · Wash the area daily with water and pat it dry. Other cleaning products, such as hydrogen peroxide, can make the wounds heal more slowly. You may cover the area with a gauze bandage if it weeps or rubs against clothing. Change the bandage every day. · Keep the area clean and dry. Follow-up care is a key part of your treatment and safety. Be sure to make and go to all appointments, and call your doctor if you are having problems. It's also a good idea to know your test results and keep a list of the medicines you take. When should you call for help? Call 911 anytime you think you may need emergency care. For example, call if: 
· You passed out (lost consciousness). · You have severe trouble breathing. · You have sudden chest pain and shortness of breath, or you cough up blood or foamy, pink mucus.  
· You have a lump that is getting bigger under your skin where the incisions were made in your groin. · You have severe pain in your belly. · You have chest pain or pressure. This may occur with: ¨ Sweating. ¨ Shortness of breath. ¨ Nausea or vomiting. ¨ Pain that spreads from the chest to the neck, jaw, or one or both shoulders or arms. ¨ Dizziness or lightheadedness. ¨ A fast or uneven pulse. After calling 911, chew 1 adult-strength or 2 to 4 low-dose aspirin. Wait for an ambulance. Do not try to drive yourself. Call your doctor now or seek immediate medical care if: 
· You have new or increased shortness of breath. · You are dizzy or lightheaded, or you feel like you may faint. · You are sick to your stomach or cannot keep fluids down. · You have pain that does not get better after you take pain medicine. · You have a fever over 100°F. 
· You have loose stitches, or one of your incisions comes open. · Bright red blood has soaked through the bandage over your incisions. · You have signs of infection, such as: 
¨ Increased pain, swelling, warmth, or redness. ¨ Red streaks leading from the incisions. ¨ Pus draining from the incisions. ¨ Swollen lymph nodes in your neck, armpits, or groin. ¨ A fever. · You have signs of a blood clot, such as: 
¨ Pain in your calf, back of the knee, thigh, or groin. ¨ Redness and swelling in your leg or groin. Watch closely for any changes in your health, and be sure to contact your doctor if: 
· You have sudden weight gain, such as 3 pounds or more in 2 to 3 days. · You have increased swelling in your legs, ankles, or feet. Where can you learn more? Go to http://annabella-abran.info/. Enter [phone] in the search box to learn more about \"Endovascular Aortic Aneurysm Repair: What to Expect at Home. \" Current as of: March 20, 2017 Content Version: 11.3 © 7541-4939 [employer].  Care instructions adapted under license by [employer] (which disclaims liability or warranty for this information). If you have questions about a medical condition or this instruction, always ask your healthcare professional. [address] any warranty or liability for your use of this information. Discharge Orders None ACO Transitions of Care Introducing [employer] [address] Randi Mccollum offers a voluntary care coordination program to provide high quality service and care to [facility] fee-for-service beneficiaries. Carol Disla was designed to help you enhance your health and well-being through the following services: ? Transitions of Care  support for individuals who are transitioning from one care setting to another (example: Hospital to home). ? Chronic and Complex Care Coordination  support for individuals and caregivers of those with serious or chronic illnesses or with more than one chronic (ongoing) condition and those who take a number of different medications. If you meet specific medical criteria, a [address] [place] [address] Rd may call you directly to coordinate your care with your primary care physician and your other care providers. For questions about the [facility] programs, please, contact your physicians office. For general questions or additional information about Accountable Care Organizations: 
Please visit www.medicare.gov/acos. html or call 1-800-MEDICARE (9-403.465.9295) TTY users should call 9-828.265.8399. General Information Please provide this summary of care documentation to your next provider. Patient Signature:  ____________________________________________________________ Date:  ____________________________________________________________  
  
Leida Dominguez Provider Signature:  ____________________________________________________________ Date:  ____________________________________________________________

## 2017-08-10 NOTE — IP AVS SNAPSHOT
[address] [place] [address] 
[phone] Patient: Bola Richey MRN: [id number] Current Discharge Medication List  
  
START taking these medications Dose & Instructions Dispensing Information Comments Morning Noon Evening Bedtime  
 oxyCODONE-acetaminophen 5-325 mg per tablet Commonly known as:  PERCOCET Your next dose is: Take as needed for pain Dose:  1-2 Tab Take 1-2 Tabs by mouth every four (4) hours as needed for Pain. Max Daily Amount: 12 Tabs. Quantity:  40 Tab Refills:  0 CONTINUE these medications which have NOT CHANGED Dose & Instructions Dispensing Information Comments Morning Noon Evening Bedtime  
 chlorthalidone 25 mg tablet Commonly known as:  Fredi Jack Your next dose is: Take as you were per home routine Dose:  12.5 mg Take 0.5 Tabs by mouth daily. Quantity:  45 Tab Refills:  3  
     
   
   
   
  
 dilTIAZem  mg ER capsule Commonly known as:  CARDIZEM CD Your next dose is:  Tomorrow 08/12/2017 TAKE 1 CAPSULE BY MOUTH ONCE DAILY Quantity:  90 Cap Refills:  3  
     
   
   
   
  
 fluticasone 50 mcg/actuation nasal spray Commonly known as:  Sharmin Anderson Your next dose is: Take as you were at home 2 sprays in each nostril daily Quantity:  1 Bottle Refills:  2 Iron 325 mg (65 mg iron) tablet Generic drug:  ferrous sulfate Your next dose is: Take as you were at home Take  by mouth Daily (before breakfast). Refills:  0  
     
   
   
   
  
 lisinopril 5 mg tablet Commonly known as:  Tyrone Gore Your next dose is:  Tomorrow 08/12/2017 Dose:  5 mg Take 5 mg by mouth daily. Refills:  0  
     
   
   
   
  
 VITAMIN B-12 500 mcg tablet Generic drug:  cyanocobalamin Your next dose is: Take as you were at home Dose:  1000 mcg Take 1,000 mcg by mouth daily. Refills:  0  
     
   
   
   
  
 VITAMIN D3 2,000 unit Tab Generic drug:  cholecalciferol (vitamin D3) Your next dose is: Take as you were at home Dose:  2000 Units Take 2,000 Units by mouth daily. Refills:  0 Where to Get Your Medications Information on where to get these meds will be given to you by the nurse or doctor. ! Ask your nurse or doctor about these medications  
  oxyCODONE-acetaminophen 5-325 mg per tablet

## 2017-08-10 NOTE — ANESTHESIA PREPROCEDURE EVALUATION
Anesthetic History   No history of anesthetic complications       Comments:   Risk Factors for Postoperative nausea/vomiting:       History of postoperative nausea/vomiting? NO       Female? NO       Motion sickness? NO       Intended opioid administration for postoperative analgesia? YES      Smoking Abstinence  Current Smoker? NO  Elective Surgery? YES  Seen preoperatively by anesthesiologist or proxy prior to day of surgery? YES  Pt abstained from smoking 24 hours prior to anesthesia? YES     Review of Systems / Medical History  Patient summary reviewed and pertinent labs reviewed    Pulmonary    COPD      Undiagnosed apnea         Neuro/Psych   Within defined limits           Cardiovascular    Hypertension: poorly controlled  Valvular problems/murmurs: aortic stenosis      Dysrhythmias   PAD and hyperlipidemia    Exercise tolerance: <4 METS  Comments: 1. Emphysematous changes. Scarring in the right lower lung zone presumably related to the right lower lobe scarring. 2.  Stable pleural-based lateral aspect nodular density with associated small pleural thickening in the right lower lung zone. 3.  Posterior aspect pleura-based density on the lateral view. Developing loculated effusion or pleural based mass cannot be excluded process. GI/Hepatic/Renal     GERD    Renal disease: CRI       Endo/Other        Arthritis and anemia     Other Findings   Comments: Left ventricle: Systolic function was mildly reduced. Ejection fraction  was estimated in the range of 40 % to 45 %. There were no regional wall motion abnormalities. Doppler parameters were consistent with abnormal left ventricular relaxation (grade 1 diastolic dysfunction). Aortic valve: Transaortic velocity was increased due to valvular stenosis. There was mild stenosis. Valve peak gradient was 24 mmHg. Valve mean gradient was 15 mmHg.          Physical Exam    Airway  Mallampati: IV  TM Distance: 4 - 6 cm  Neck ROM: normal range of motion Cardiovascular    Rhythm: regular  Rate: normal    Murmur: Grade 3, Aortic area     Dental    Dentition: Poor dentition and Loose teeth     Pulmonary  Breath sounds clear to auscultation               Abdominal  GI exam deferred       Other Findings            Anesthetic Plan    ASA: 4  Anesthesia type: general    Monitoring Plan: Arterial line, BIS and UNRULY      Induction: Intravenous  Anesthetic plan and risks discussed with: Patient

## 2017-08-10 NOTE — ANESTHESIA PROCEDURE NOTES
UNRULY        Procedure Details: probe placement, image aquisition & interpretation    Site marked, Timeout performed  Risks and benefits discussed with the patient and plans are to proceed    Procedure Note    Performed by: Aretha Urrutia by: Juan Chavez       Indications: assessment of ascending aorta  Modalities: 2D, CF, CWD, PWD  Probe Type: multiplane  Insertion: atraumatic  Patient Status: intubated and sedated    Echocardiographic and Doppler Measurements   Aorta  Size  Diam(cm)  Dissection PlaqueThick(mm)  Plaque Mobile    Ascending dilated  No >3 No    Arch dilated  No >3 No    Descending normal  No >3 No          Valves  Annulus  Stenosis  Area/Grad  Regurg  Leaflet   Morph  Leaflet   Motion    Aortic calcified moderate 0.9-1.1 CM2 2+ calcified, thickened restricted    Mitral  none  3+ calcified, thickened prolapse, restricted    Tricuspid dilated none  2+ normal normal          Atria  Size  SEC (smoke)  Thrombus  Tumor  Device    Rt Atrium dilated No No No No    Lt Atrium dilated No No No No     Interatrial Septum Morphology: lipomatous hypertrophy      Ventricle  Cavity Size  Cavity Dimension Hypertrophy  Thrombus  Gloal FXN  EF    RV dilated  No no moderately impaired     LV dilated  Yes No moderately impaired 30%35%       Regional Function  (1 = normal, 2 = mildly hypokinetic, 3 = severely hypokinetic, 4 = akinetic, 5 = dyskinetic) LAV - Long [place]   ME LAV = 0  ME LAV = 90  ME LAV = 130   Basal Sept:2 Basal Ant:2 Basal Post:3   Mid Sept:2 Mid Ant:2 Mid Post:2   Apical Sept:2 Apical Ant:2 Basal Ant Sept:2   Basal Lat:2 Basal Inf:2 Mid Ant Sept:2   Mid Lat:2 Mid Inf:2    Apical Lat:2 Apical Inf:2        Pericardium: normal    Post Intervention Follow-up Study         Valve  Function  Regurgitation  Area    Aortic       Mitral       Tricuspid       Prosthetic        Complications: None  Comments: EXAM IN SINUS BRADYCARDIA ; /50S / GETA     LV ENLARGEMENT 6.39CM X 5.95 CM LVEDD; MILD ECCENTRIC LVH WITH SEPTAL HYPERTROPHY WITH MILD DYNAMIC LVOT OBSTRUCTION  , GLOBAL HYPOKINESIA     LA MILD ENLARGEMENT NO MASSSES KASANDRA FREE OF ECHOGENIC MASS    MV ANNULUS CALCIFIED POSTERIORLY, DILATED ; NO MS ; MODERATE MR CENTRAL JET  VC 0.5 - 0.7 CM , MR V MAX 5 M/SEC , POSTERIOR MV LEAFLET CALCIFIED, TETHERED, P3 PROLAPSE     AV 3 LEAFLETS SEVERELY CALCIFIED , MODERATE AS ESTRELLA 1.1 CM 2, RESTRICTED, PG 32 MHG MG 19 MMHG, MILD AI ECCENTRIC JET ORIENTED POSTERIORLY AND A SECOND AI JET BETWEEN NCC AND LCC  AV ANNULUS 2 CM, SINUS OF VAVLSALVA 2.88 CM, STJ 2.3 CM, ASCENDIC AORTA 3.62 CM   - POSTSTENOTIC DILATATION, WITH GRADE 2 -3 ATHEROMAS, NO DISSECTION     AORTIC ARCH 2.85 CM ATHEROMA GRADE 3 COMPLEX, CIRCUMFERENTIAL     DESC THORACIC AORTA NORMAL SIZE GRADE 3 ATHEROMA NO MOBILE PLAQUE, NO DISSECTION    RV ENLARGED FREE WALL HYPOKINESIS     TV ANNULUS DILATED, MILD TO MODERATE TR CENTRAL JET     PV - MILD PI CENTRAL =JET     RA ENLARGED IAS LIPOMATOUS HYPERTROPHY NO PFO BY CFD      NORMAL PERICARDIUM

## 2017-08-10 NOTE — OP NOTES
[address]    Name:  Klarissa Ansari  MR#:  [id number]  :  1935  Account #:  [de-identified]  Date of Adm:  08/10/2017  Date of Surgery:  08/10/2017      SURGEON: Felipa Poe DO. PREOPERATIVE DIAGNOSIS: Expanding aortic aneurysm without  rupture. POSTOPERATIVE DIAGNOSIS: Expanding aortic aneurysm without  rupture. PROCEDURES PERFORMED:  1. Bilateral open femoral exposure, [id number]. 2. Placement of catheter into aorta, 84549.  3. Placement of catheter into left renal selective, 62548.  4. Repair of aortic aneurysm with modular device, [id number].  5. Angiography with interpretation for aortic repair 72396-07.  6. Extension prosthesis into initial vessel left common iliac, 98617.  7. Angiography, left pelvic 4 extension prosthesis with interpretation  11634-86.  8. IVUS noncoronary initial vessel 55608. COMPLICATIONS: 0.    ESTIMATED BLOOD LOSS: 300 mL. CONDITION OF THE PATIENT: Stable. SPECIMENS REMOVED: None. ANESTHESIA: General.    DESCRIPTION OF PROCEDURE: The patient is an 71-year-old male. He has this progressively enlarging aortic aneurysm. He is at the limits  of what an endostent can repair. We discussed this at length, this is  an opportunity to treat this with an endostent. He is a poor open  surgical candidate. He has renal insufficiency, advanced cardiac  disease with aortic stenosis. He is agreeable to the repair. He had a  preoperative antibiotic. He was transferred to the room where he had a  Granda catheter A-line, shaved, prepped and draped per [facility] compliant  guidelines for aseptic technique. General anesthesia per anesthesia. I made bilateral open common femoral exposures, vessels were  appropriate for access. I placed vessel loops proximal and distal at  both sites. First I accessed the right common femoral, placed a wire  into the aorta heparinized them initially with a 10,000 heparin bolus.  I  gave 2 additional 2000 boluses, placed a wire all the way up into the  thoracic aorta, seen directly with fluoroscopy. Exchanged for a stiff wire  and brought an IVUS catheter up through an 11-Kazakh sheath and  IVUS from the external iliac artery, all the way up to the visceral  vessels measuring this diameter. It confirmed well with CT and then  the location of the renals, which were challenging to see on  angiography. I was trying to do dye sparing today to limit how much  contrast he gets because of his renal insufficiency. Made markings on  the screen with the IVUS and made measurements and chose a 36 x 14  x 103 main body graft, brought this up to the level of the renals. Did  focused angiography again challenging to see the renals, so I placed a  wire and catheter into the left renalconfirmed it correlated well with IVUS  and with angiography. Deployed the graft where it saw just below the  renals and opened up the gate. I cannulated the gate and then placed  a 16 x 21 s 24. Did a left pelvic arteriogram. This needed further  extension to cover to the bifurcation, so I extended with a 16 x 20 x 82  with good extension and fixed the minor endoleak as well. I deployed  the remainder of the graft and the crown, recaptured the crown and did  right pelvic angiography. Deployed the modular piece here. This was  a 16 x 16 x 124. Balloon angioplastied the entire endoprosthesis,  performed an angiogram. The aorta is patent. Bilateral renal arteries  are well visualized and patent. This is the SMA. The body of the graft  was patent. There was no endoleak notable. The flow through the limb  is good. The runoff is into the internal and external iliac arteries  bilateral. I do not see any late endoleak as well. I wired the catheter  and removed them. Removed the sheaths, repaired the arteries  individually with interrupted 5-0 Prolene sutures. There was good  pulses on both sides.   Irrigated and closed with 2-0 Monocryl for the  fascia, 3-0 Monocryl for subcutaneous, 4-0 Monocryl and Dermabond  glue for skin. He was transferred to recovery in stable condition.         DO SUJATHA Larkin  D:  08/10/2017   11:30  T:  08/10/2017   15:57  Job #:  [id number]

## 2017-08-10 NOTE — ANESTHESIA PROCEDURE NOTES
Arterial Line Placement    Performed by: Beatrice Freeman by: Janel Molina     Pre-Procedure  Indications:  Arterial pressure monitoring and blood sampling  Preanesthetic Checklist: patient identified, risks and benefits discussed, anesthesia consent, site marked, patient being monitored, timeout performed and patient being monitored      Procedure:   Prep:  Chlorhexidine  Seldinger Technique?: Yes    Orientation:  Left  Location:  Radial artery  Catheter size:  20 G  Number of attempts:  1  Cont Cardiac Output Sensor: No      Assessment:   Post-procedure:  Line secured and sterile dressing applied  Patient Tolerance:  Patient tolerated the procedure well with no immediate complications

## 2017-08-10 NOTE — ANESTHESIA POSTPROCEDURE EVALUATION
Post-Anesthesia Evaluation and Assessment    Patient: Yazmin Yancey MRN: [id number]  SSN: [id number]    YOB: 1935  Age: 81 y.o. Sex: male       Cardiovascular Function/Vital Signs  Visit Vitals    /53    Pulse (!) 50    Temp 36.4 °C (97.5 °F)    Resp (!) 0    Ht 5' 11\" (1.803 m)    Wt 97.1 kg (214 lb)    SpO2 100%    BMI 29.85 kg/m2       Patient is status post general anesthesia for Procedure(s):  AORTIC ABDOMINAL ANEURYSM REPAIR ENDOVASCULAR (EVAR). Nausea/Vomiting: None    Postoperative hydration reviewed and adequate. Pain:  Pain Scale 1: Numeric (0 - 10) (08/10/17 0716)  Pain Intensity 1: 0 (08/10/17 0716)   Managed    Neurological Status:   Neuro (WDL): Within Defined Limits (08/10/17 0720)   At baseline    Mental Status and Level of Consciousness: Arousable    Pulmonary Status:   O2 Device: Nasal cannula (08/10/17 1200)   Adequate oxygenation and airway patent    Complications related to anesthesia: None    Post-anesthesia assessment completed.  No concerns    Signed By: Kellie Spann MD     August 10, 2017

## 2017-08-11 NOTE — PROGRESS NOTES
conducted an initial consultation and Spiritual Assessment for [employer], who is a 81 y.o.,male. Patients Primary Language is: [place]. According to the patients EMR Caodaism Affiliation is: [place]. The reason the Patient came to the hospital is:   Patient Active Problem List    Diagnosis Date Noted    AAA (abdominal aortic aneurysm) without rupture ([facility] 08/10/2017    Cramps of right lower extremity 05/12/2017    Advance directive discussed with patient 02/17/2017    Bilateral edema of lower extremity 02/07/2017    Hyperlipidemia LDL goal <100 08/05/2016    Essential hypertension with goal blood pressure less than 140/90 08/05/2016    Chronic renal failure, stage 4 (severe) ([address] 01/26/2016    PAD (peripheral artery disease) ([facility] 11/03/2015    Cholecystitis 10/09/2015    Essential hypertension 07/22/2015    Chronic renal failure 01/20/2015    Chest pain on exertion 01/20/2015    Abdominal aortic aneurysm ([facility] 04/17/2014     Class: Chronic    Reflux esophagitis 02/28/2013    Iron deficiency anemia secondary to blood loss (chronic) 01/24/2013    Chronic LBP     Allergic rhinitis     Obesity     Hyperlipidemia 06/07/2011    Polymyalgia ([address] 06/07/2011        The  provided the following Interventions:  Initiated a relationship of care and support. Explored issues of rosalba, belief, spirituality and Restoration/ritual needs while hospitalized. Listened empathically. Provided chaplaincy education. Provided information about Spiritual Care Services. Offered prayer and assurance of continued prayers on patient's behalf. Chart reviewed. The following outcomes where achieved:  Patient shared limited information about both their medical narrative and spiritual journey/beliefs. Patient processed feeling about current hospitalization. Patient expressed gratitude for 's visit.     Assessment:  Patient does not have any Restoration/cultural needs that will affect patients preferences in health care. There are no spiritual or Congregational issues which require intervention at this time. Plan:  Chaplains will continue to follow and will provide pastoral care on an as needed/requested basis.  recommends bedside caregivers page  on duty if patient shows signs of acute spiritual or emotional distress.       [address] [facility]   [phone]

## 2017-08-11 NOTE — ROUTINE PROCESS
6128 notified Dr. Manny Cobos of Patient 's to 160s, new orders rec'd   0730 Bedside and Verbal shift change report given to Serene Rdz (oncoming nurse) by Rudi Avila RN (offgoing nurse). Report included the following information SBAR, Kardex, Intake/Output, MAR and Alarm Parameters .

## 2017-08-11 NOTE — ROUTINE PROCESS
0730: report received and care assumed from [facility]. Vital signs stable, pt up in recliner resting. Will continue to monitor. 3584: Arterial line dc'd, cath tip intact. Pressure held per protocol. Granda dc'd at this tiime. Pt raymundo  Without difficulty. 0940: Discharge apts made, awaiting ride from family around approximately 1100.  1010: All written discharge instructions reviewed with pt, pt denies any further questions at this time. Will continue to monitor. 12: Discharged to home, pt awaiting ride in 2314. Vital signs stable, pt A&O x4, Bilat groin sites well approximated and no signs or symptoms of hematoma. All pt belongings sent with pt to . And report given Xander Booth RN to be monitored in CVT Stepdown.

## 2017-08-11 NOTE — DISCHARGE INSTRUCTIONS
Endovascular Aortic Aneurysm Repair: What to Expect at Home  Your Recovery  Endovascular aortic aneurysm repair is a procedure to fix a weak and bulging section of the aorta. The aorta is the large blood vessel (artery) that carries blood from the heart through the belly to the rest of the body. The doctor put a man-made tube called a graft inside the aneurysm. Blood will pass through the graft in the aorta without pushing on the aneurysm. You can expect the cuts (incisions) in your groin to be sore for 1 to 2 weeks. If you have stitches or staples in your incisions, the doctor may need to take them out. You may feel more tired than usual for 1 to 2 weeks after surgery. You may be able to do many of your usual activities after 1 to 2 weeks. But you will probably need up to 4 weeks to fully recover. Strenuous activities will not hurt the graft in your aorta, but they may cause problems with the incisions in your groin. You can be more active when your groin is no longer sore. This care sheet gives you a general idea about how long it will take for you to recover. But each person recovers at a different pace. Follow the steps below to get better as quickly as possible. How can you care for yourself at home? Activity  · Rest when you feel tired. Getting enough sleep will help you recover. · Try to walk each day. Start by walking a little more than you did the day before. Bit by bit, increase the amount you walk. Walking boosts blood flow and helps prevent pneumonia and constipation. · Avoid strenuous activities, such as bicycle riding, jogging, weight lifting, or aerobic exercise. Your doctor will tell you when it's okay to do strenuous activity. · Ask your doctor when you can drive again. · You will probably need to take at least 1 to 2 weeks off from work. It depends on the type of work you do and how you feel. · You may shower as usual. Pat the incisions dry.  Do not take a bath for the first 2 weeks, or until your doctor tells you it is okay. Diet  · You can eat your normal diet. If your stomach is upset, try bland, low-fat foods like plain rice, broiled chicken, toast, and yogurt. · Drink plenty of fluids (unless your doctor tells you not to). · You may notice that your bowel movements are not regular right after your surgery. This is common. Try to avoid constipation and straining with bowel movements. You may want to take a fiber supplement every day. If you have not had a bowel movement after a couple of days, ask your doctor about taking a mild laxative. Medicines  · Your doctor will tell you if and when you can restart your medicines. He or she will also give you instructions about taking any new medicines. · If you take blood thinners, such as warfarin (Coumadin), clopidogrel (Plavix), or aspirin, be sure to talk to your doctor. He or she will tell you if and when to start taking those medicines again. Make sure that you understand exactly what your doctor wants you to do. · Be safe with medicines. Take pain medicines exactly as directed. ¨ If the doctor gave you a prescription medicine for pain, take it as prescribed. ¨ If you are not taking a prescription pain medicine, ask your doctor if you can take an over-the-counter medicine. ¨ Do not take two or more pain medicines at the same time unless the doctor told you to. Many pain medicines have acetaminophen, which is Tylenol. Too much acetaminophen (Tylenol) can be harmful. · If you think your pain medicine is making you sick to your stomach:  ¨ Take your medicine after meals (unless your doctor has told you not to). ¨ Ask your doctor for a different pain medicine. · If your doctor prescribed antibiotics, take them as directed. Do not stop taking them just because you feel better. You need to take the full course of antibiotics. Incision care  · You can shower tomorrow (Saturday 08/12/2017).  Do not scrub incision just allow soap and water to run over incision and pat dry with towel. · Wash the area daily with water and pat it dry. Other cleaning products, such as hydrogen peroxide, can make the wounds heal more slowly. You may cover the area with a gauze bandage if it weeps or rubs against clothing. Change the bandage every day. · Keep the area clean and dry. Follow-up care is a key part of your treatment and safety. Be sure to make and go to all appointments, and call your doctor if you are having problems. It's also a good idea to know your test results and keep a list of the medicines you take. When should you call for help? Call 911 anytime you think you may need emergency care. For example, call if:  · You passed out (lost consciousness). · You have severe trouble breathing. · You have sudden chest pain and shortness of breath, or you cough up blood or foamy, pink mucus. · You have a lump that is getting bigger under your skin where the incisions were made in your groin. · You have severe pain in your belly. · You have chest pain or pressure. This may occur with:  ¨ Sweating. ¨ Shortness of breath. ¨ Nausea or vomiting. ¨ Pain that spreads from the chest to the neck, jaw, or one or both shoulders or arms. ¨ Dizziness or lightheadedness. ¨ A fast or uneven pulse. After calling 911, chew 1 adult-strength or 2 to 4 low-dose aspirin. Wait for an ambulance. Do not try to drive yourself. Call your doctor now or seek immediate medical care if:  · You have new or increased shortness of breath. · You are dizzy or lightheaded, or you feel like you may faint. · You are sick to your stomach or cannot keep fluids down. · You have pain that does not get better after you take pain medicine. · You have a fever over 100°F.  · You have loose stitches, or one of your incisions comes open. · Bright red blood has soaked through the bandage over your incisions.   · You have signs of infection, such as:  ¨ Increased pain, swelling, warmth, or redness. ¨ Red streaks leading from the incisions. ¨ Pus draining from the incisions. ¨ Swollen lymph nodes in your neck, armpits, or groin. ¨ A fever. · You have signs of a blood clot, such as:  ¨ Pain in your calf, back of the knee, thigh, or groin. ¨ Redness and swelling in your leg or groin. Watch closely for any changes in your health, and be sure to contact your doctor if:  · You have sudden weight gain, such as 3 pounds or more in 2 to 3 days. · You have increased swelling in your legs, ankles, or feet. Where can you learn more? Go to http://annabella-abran.info/. Enter [phone] [id number] [phone] in the search box to learn more about \"Endovascular Aortic Aneurysm Repair: What to Expect at Home. \"  Current as of: March 20, 2017  Content Version: 11.3  © 3518-2255 [employer]. Care instructions adapted under license by [employer] (which disclaims liability or warranty for this information). If you have questions about a medical condition or this instruction, always ask your healthcare professional. [place] [address] any warranty or liability for your use of this information.

## 2017-08-12 NOTE — DISCHARGE SUMMARY
[address]  [address] [place] Jill Heck SUMMARY    Name:  Sarah Nunez  MR#:  [id number]  :  1935  Account #:  [de-identified]  Date of Adm:  08/10/2017  Date of Discharge:  2017      REASON FOR ADMISSION: Treatment of expanding aortic aneurysm. HOSPITAL COURSE: The patient was admitted to the hospital. He  underwent endovascular repair of his aortic aneurysm. I did this all  endovascular through groin incisions. His risk factors included cardiac,  renal failure and difficulty with breathing. He tolerated the surgery well. His creatinine preop was 3.1, postop 2.47. No signs of ischemia. His  renal arteries were well visualized and preserved. His hemoglobin  preop was 10, post is 9.5. Groins are soft, flat and stable. He feels  well. He is tolerating his regular diet, wishes to go home today. Vital  signs are stable. Will discharge him home with his usual medications,  also give him additional prescription for a pain pill. His daughter is in  town, she is staying with him for the weekend to help out with his wife  as well. DISPOSITION FOR DISCHARGE: Stable.         Sapphire Mayberry DO CM / ELLE  D:  2017   08:46  T:  2017   20:37  Job #:  [id number]

## 2017-08-14 NOTE — PROGRESS NOTES
Transition of care coordination/Procedure    Patient admitted to [facility] for a scheduled procedure on 8/10/17 to 8/11/17 for AAA repair. Indication: AAA    Patient in office for face-to-face transition of care follow up appointment with PCP. Appointments:  Dr. Chris Manzano 8/22/17 at 10:45 AM    Plan:  Patient will attend scheduled follow up with vascular. Patient will monitor/report any new or concerning s/s.

## 2017-08-14 NOTE — PROGRESS NOTES
Teto Abrams 1935, is a 81 y.o. male, who is seen today for transitional care from recent hospitalization. He underwent percutaneous surgery for abdominal aortic aneurysm and was discharged in the hospital 3 days ago. He has gotten some help with his wife who has advanced Alzheimer's disease, and who is with him today. He has developed marked swelling of the scrotum and some swelling in the thighs and lower legs but is breathing well and overall feeling pretty well. No chest pain or significant dyspnea. He is taking all of his medicine correctly. Past Medical History:   Diagnosis Date    Abdominal aortic aneurysm ([facility] 4/17/2014    3.4 cm,  5/12    Allergic rhinitis     Anemia NEC     mildly    Arthritis     Chronic kidney disease     Chronic LBP     CKD (chronic kidney disease) stage 4, GFR 15-29 ml/min ([facility]     Gross hematuria     rarely    Headache     Heart failure (HCC)     History of echocardiogram 07/2011    EF 55-60%, mild LVH, aortic valve sclerosis without stenosis    History of nuclear stress test 07/2011    EF 70%, no ischemia, no infarct, normal study    HTN (hypertension) 6/7/2011    Hypercholesterolemia     Ill-defined condition     MODERATE AORTIC VALVE STENOSIS, MILD AI, MODERATE MR    Obesity     PAF (paroxysmal atrial fibrillation) ([facility] 2011    [facility] 6/7/2011     Current Outpatient Prescriptions   Medication Sig Dispense Refill    oxyCODONE-acetaminophen (PERCOCET) 5-325 mg per tablet Take 1-2 Tabs by mouth every four (4) hours as needed for Pain. Max Daily Amount: 12 Tabs. 40 Tab 0    cholecalciferol, vitamin D3, (VITAMIN D3) 2,000 unit tab Take 2,000 Units by mouth daily.  dilTIAZem CD (CARDIZEM CD) 240 mg ER capsule TAKE 1 CAPSULE BY MOUTH ONCE DAILY 90 Cap 3    fluticasone (FLONASE) 50 mcg/actuation nasal spray 2 sprays in each nostril daily 1 Bottle 2    chlorthalidone (HYGROTEN) 25 mg tablet Take 0.5 Tabs by mouth daily.  45 Tab 3  cyanocobalamin (VITAMIN B-12) 500 mcg tablet Take 1,000 mcg by mouth daily.  ferrous sulfate (IRON) 325 mg (65 mg iron) tablet Take  by mouth Daily (before breakfast). Visit Vitals    /60 (BP 1 Location: Left arm, BP Patient Position: Sitting)    Pulse 93    Temp 98.4 °F (36.9 °C) (Oral)    Resp 14    Ht 5' 11\" (1.803 m)    Wt 220 lb 9.6 oz (100.1 kg)    SpO2 94%    BMI 30.77 kg/m2     Carotids are 2+ without bruits. No JVD or HJR. Lungs are clear to percussion. Quite good breath sounds with a few crackles in the bases. No wheezing. Heart reveals a regular rhythm with a 2/6 systolic murmur along left sternal border. No gallop click or rub. Lower extremities reveal 1+ edema bilaterally. He has bilateral inguinal incisions which have been glued together and are healing nicely, there are no open areas and no surrounding redness and only minimal induration. Scrotum is very large but there is no tenderness, there is a great deal of scrotal edema. Assessment: #1. Doing quite well after recent percutaneous repair of abdominal aortic aneurysm though with a lot of edema in the scrotum and some in the lower extremities. He will continue furosemide twice daily for 4 days as ordered by his nephrologist, Dr. Yin Martinez, and then he will be back on once a day along with chlorthalidone. #2. He will finish out his course of ferrous sulfate as directed. He will follow-up with Dr. Graciela Woodson as planned next week. Follow-up here as previously planned with lab. Terry Guzman MD [facility]    Please note: This document has been produced using voice recognition software. Unrecognized errors in transcription may be present.

## 2017-08-14 NOTE — MR AVS SNAPSHOT
Visit Information Date & Time Provider Department Dept. Phone Encounter #  
 8/14/2017  3:00 PM Tha Salguero MD Internist of [address] [place] [address] [id number] Follow-up Instructions Routing History Your Appointments 8/22/2017  9:55 AM  
LAB with [facility] NURSE VISIT Internist of [facility] ([address] [place] [address]) Appt Note: labs [address], [address] [place] [address]  
  
   
 [address], [address]  
  
    
 8/22/2017 10:45 AM  
HOSPITAL DISCHARGE with Brianna Stein MD  
[facility] [address] [place] [address]) Appt Note: DC EVAR  
 [address] [facility] [address] [place] [address]  
[phone] [address]  
  
    
 8/29/2017  3:30 PM  
Office Visit with Tha Salguero MD  
Internist of [address]) Appt Note: ov 3mos. rm  
 [address], [address] [place] [address] [address] [place]  
  
   
 [address], [address]  
  
    
 2/2/2018 11:20 AM  
Follow Up with Arabella Rodriguez MD  
[facility] \[facility]\"" ([address] [place] [address]) Appt Note: 6 month f/up [place] [address] [address] [id number] [phone] [facility] [address] Upcoming Health Maintenance Date Due INFLUENZA AGE 9 TO ADULT 8/1/2017 MEDICARE YEARLY EXAM 2/18/2018 GLAUCOMA SCREENING Q2Y 9/13/2018 DTaP/Tdap/Td series (2 - Td) 10/16/2022 Allergies as of 8/14/2017  Review Complete On: 8/14/2017 By: Tha Salguero MD  
  
 Severity Noted Reaction Type Reactions Aspirin  06/07/2011    Other (comments) Gi side effects Nsaids (Non-steroidal Anti-inflammatory Drug)  06/07/2011   Intolerance Other (comments) Gi upset Current Immunizations  Reviewed on 1/20/2015 Name Date Hepatitis B Vaccine 1/10/2002, 8/3/2001, 6/6/2001 Influenza High Dose Vaccine PF 10/14/2016 Influenza Vaccine 10/14/2015 Influenza Vaccine Whole 10/16/2012, 9/21/2011, 9/16/2010, 9/25/2009, 10/3/2008, 10/10/2007, 11/7/2006, 10/13/2005, 11/10/2004, 10/23/2003, 10/17/2002 Pneumococcal Conjugate (PCV-13) 10/14/2015 TD Vaccine 10/13/2005, 5/31/1995 TDAP Vaccine 10/16/2012 ZZZ-RETIRED (DO NOT USE) Pneumococcal Vaccine (Unspecified Type) 10/17/2002 Not reviewed this visit Vitals BP Pulse Temp Resp Height(growth percentile) Weight(growth percentile) 128/60 (BP 1 Location: Left arm, BP Patient Position: Sitting) 93 98.4 °F (36.9 °C) (Oral) 14 5' 11\" (1.803 m) 220 lb 9.6 oz (100.1 kg) SpO2 BMI Smoking Status 94% 30.77 kg/m2 Never Smoker Vitals History BMI and BSA Data Body Mass Index Body Surface Area 30.77 kg/m 2 2.24 m 2 Preferred Pharmacy Pharmacy Name Phone [address] [place] [address], [address] [place] [address] [phone] Your Updated Medication List  
  
   
This list is accurate as of: 8/14/17  4:03 PM.  Always use your most recent med list.  
  
  
  
  
 chlorthalidone 25 mg tablet Commonly known as:  Bakari Del Toro Take 0.5 Tabs by mouth daily. dilTIAZem  mg ER capsule Commonly known as:  CARDIZEM CD  
TAKE 1 CAPSULE BY MOUTH ONCE DAILY  
  
 fluticasone 50 mcg/actuation nasal spray Commonly known as:  FLONASE  
2 sprays in each nostril daily Iron 325 mg (65 mg iron) tablet Generic drug:  ferrous sulfate Take  by mouth Daily (before breakfast). oxyCODONE-acetaminophen 5-325 mg per tablet Commonly known as:  PERCOCET Take 1-2 Tabs by mouth every four (4) hours as needed for Pain. Max Daily Amount: 12 Tabs. VITAMIN B-12 500 mcg tablet Generic drug:  cyanocobalamin Take 1,000 mcg by mouth daily. VITAMIN D3 2,000 unit Tab Generic drug:  cholecalciferol (vitamin D3) Take 2,000 Units by mouth daily. Please provide this summary of care documentation to your next provider. Your primary care clinician is listed as [place] [facility]. Anat Dear. If you have any questions after today's visit, please call [phone].

## 2017-09-06 ENCOUNTER — PATIENT OUTREACH (OUTPATIENT)
Dept: INTERNAL MEDICINE CLINIC | Age: 82
End: 2017-09-06

## (undated) DEVICE — SUTURE MCRYL SZ 2-0 L36IN ABSRB UD L36MM CT-1 1/2 CIR Y945H

## (undated) DEVICE — TRAY CATH OD16FR SIL URIN M STATLOK STBL DEV SURSTP

## (undated) DEVICE — AMPLATZ TYPE GRADUATED RENAL DILATATION SET

## (undated) DEVICE — SHTH INTRO VASC 18X28CM -- SENTRANT

## (undated) DEVICE — GLOVE SURG SZ 7.5 L11.73IN FNGR THK9.8MIL STRW LTX POLYMER

## (undated) DEVICE — DRAPE,TOP,102X53,STERILE: Brand: MEDLINE

## (undated) DEVICE — GLOVE SURG SZ 7 L11.33IN FNGR THK9.8MIL STRW LTX POLYMER

## (undated) DEVICE — APPLIER CLP AUTO MED 9.75 IN TI SURGCLP SUPER INTLOK 20 DISP

## (undated) DEVICE — PRESSURE MONITORING SET: Brand: TRUWAVE

## (undated) DEVICE — SYR 50ML LR LCK 1ML GRAD NSAF --

## (undated) DEVICE — TUBING PRSS 48IN 1200PSI CLR HI PRSS INJ EXCITE FLX

## (undated) DEVICE — SUTURE PERMA-HAND SZ 3-0 L24IN NONABSORBABLE BLK W/O NDL SA74H

## (undated) DEVICE — Device: Brand: VISIONS PV .035 DIGITAL IVUS CATHETER

## (undated) DEVICE — (D)PREP SKN CHLRAPRP APPL 26ML -- CONVERT TO ITEM 371833

## (undated) DEVICE — SUTURE PROL SZ 5-0 L36IN NONABSORBABLE BLU L13MM C-1 3/8 8720H

## (undated) DEVICE — SUT PROL 6-0 30IN C1 DA BLU --

## (undated) DEVICE — 3M™ IOBAN™ 2 ANTIMICROBIAL INCISE DRAPE 6650EZ: Brand: IOBAN™ 2

## (undated) DEVICE — CATH BLLN STENT GRAF 12FRX46MM -- RELIANT

## (undated) DEVICE — CATHETER ART 20 GAX4 CM 22 GA RADIAL FEP

## (undated) DEVICE — GDWIRE ANGIO SUP STF 0.035IN --

## (undated) DEVICE — SYR POWER 150ML 8IN FILL TUBE --

## (undated) DEVICE — SYR ART 700 CLEAR MARK 7 -- ARTERION

## (undated) DEVICE — NEEDLE ANGIO 1 WALL ULT SMOOTH 19GAX7CM MERIT AVANCE

## (undated) DEVICE — INFLATION DEVICE: Brand: ENCORE™ 26

## (undated) DEVICE — REM POLYHESIVE ADULT PATIENT RETURN ELECTRODE: Brand: VALLEYLAB

## (undated) DEVICE — SOLUTION IV 1000ML 0.9% SOD CHL

## (undated) DEVICE — MAYO STAND COVER: Brand: CONVERTORS

## (undated) DEVICE — INTRODUCER SHTH 11FR CANN L23CM DIL TIP 45MM YEL W/O MINI

## (undated) DEVICE — SYR LR LCK 1ML GRAD NSAF 30ML --

## (undated) DEVICE — 3M™ BAIR PAWS FLEX™ WARMING GOWN, STANDARD, 20 PER CASE 81003: Brand: BAIR PAWS™

## (undated) DEVICE — (D)SYR 10ML 1/5ML GRAD NSAF -- PKGING CHANGE USE ITEM 338027

## (undated) DEVICE — Device

## (undated) DEVICE — KIT CLN UP BON SECOURS MARYV

## (undated) DEVICE — 3M(TM) MEDIPORE(TM) +PAD SOFT CLOTH ADHESIVE WOUND DRESSING 3569: Brand: 3M™ MEDIPORE™

## (undated) DEVICE — SUTURE MCRYL SZ 4-0 L18IN ABSRB UD L19MM PS-2 3/8 CIR PRIM Y496G

## (undated) DEVICE — SUTURE MCRYL SZ 3-0 L27IN ABSRB UD L26MM SH 1/2 CIR Y416H

## (undated) DEVICE — SUTURE MCRYL SZ 5-0 L18IN ABSRB UD L19MM PS-2 3/8 CIR PRIM Y495G

## (undated) DEVICE — SPONGE HEMOSTAT CELLULS 4X8IN -- SURGICEL

## (undated) DEVICE — GUIDEWIRE VASC L180CM DIA0035IN L15CM STR TIP PTFE HEP S STL

## (undated) DEVICE — 3M™ IOBAN™ 2 ANTIMICROBIAL INCISE DRAPE 6651EZ: Brand: IOBAN™ 2

## (undated) DEVICE — DRAPE XR C ARM 41X74IN LF --

## (undated) DEVICE — Z DISCONTINUED PER MEDLINE SYRINGE ANGIO 150ML PWR INJ FAST LOAD J STRW TBNG SPIK

## (undated) DEVICE — RADIFOCUS GLIDEWIRE: Brand: GLIDEWIRE

## (undated) DEVICE — INTENDED FOR TISSUE SEPARATION, AND OTHER PROCEDURES THAT REQUIRE A SHARP SURGICAL BLADE TO PUNCTURE OR CUT.: Brand: BARD-PARKER SAFETY BLADES SIZE 11, STERILE

## (undated) DEVICE — CATH DIAG 4/ BERN/65/035 -- IMAGER II 31-609

## (undated) DEVICE — X-RAY SPONGES,12 PLY: Brand: DERMACEA

## (undated) DEVICE — TABLE COVER: Brand: CONVERTORS

## (undated) DEVICE — DERMABOND SKIN ADH 0.7ML -- DERMABOND ADVANCED 12/BX

## (undated) DEVICE — INTRODUCER SHTH 11FR CANN L11CM DIL TIP 45MM YEL TUNGSTEN